# Patient Record
Sex: FEMALE | Race: BLACK OR AFRICAN AMERICAN | NOT HISPANIC OR LATINO | Employment: FULL TIME | URBAN - METROPOLITAN AREA
[De-identification: names, ages, dates, MRNs, and addresses within clinical notes are randomized per-mention and may not be internally consistent; named-entity substitution may affect disease eponyms.]

---

## 2017-01-03 ENCOUNTER — OFFICE VISIT (OUTPATIENT)
Dept: OBSTETRICS AND GYNECOLOGY | Facility: CLINIC | Age: 27
End: 2017-01-03
Payer: MEDICAID

## 2017-01-03 ENCOUNTER — APPOINTMENT (OUTPATIENT)
Dept: LAB | Facility: HOSPITAL | Age: 27
End: 2017-01-03
Attending: OBSTETRICS & GYNECOLOGY
Payer: MEDICAID

## 2017-01-03 VITALS — HEIGHT: 64 IN | WEIGHT: 119.06 LBS | BODY MASS INDEX: 20.32 KG/M2

## 2017-01-03 DIAGNOSIS — Z98.891 HISTORY OF CESAREAN SECTION: ICD-10-CM

## 2017-01-03 DIAGNOSIS — Z30.09 BIRTH CONTROL COUNSELING: ICD-10-CM

## 2017-01-03 PROCEDURE — 0503F POSTPARTUM CARE VISIT: CPT | Mod: ,,, | Performed by: OBSTETRICS & GYNECOLOGY

## 2017-01-03 PROCEDURE — 88175 CYTOPATH C/V AUTO FLUID REDO: CPT

## 2017-01-03 PROCEDURE — 99999 PR PBB SHADOW E&M-EST. PATIENT-LVL II: CPT | Mod: PBBFAC,,, | Performed by: OBSTETRICS & GYNECOLOGY

## 2017-01-03 PROCEDURE — 99212 OFFICE O/P EST SF 10 MIN: CPT | Mod: PBBFAC,PN | Performed by: OBSTETRICS & GYNECOLOGY

## 2017-01-03 NOTE — PROGRESS NOTES
"Subjective:       Arabella Gibbs is a 26 y.o. female who presents for a postpartum visit. She is 5 weeks postpartum following a low cervical transverse  section. I have fully reviewed the prenatal and intrapartum course. The delivery was at 36 gestational weeks. Outcome: repeat  section, low transverse incision. Anesthesia: spinal. Postpartum course has been uncomplicated. Baby's course has been uncomplicated. Baby is feeding by bottle - Enfamil with Iron. Bleeding no bleeding. Bowel function is normal. Bladder function is normal. Patient is not sexually active. Contraception method is abstinence. Postpartum depression screening: negative.    Patient desires Nexplanon. Is not sexually active and wants to wait till device placed.  H/o abnormal pap.     The following portions of the patient's history were reviewed and updated as appropriate: allergies, current medications, past family history, past medical history, past social history, past surgical history and problem list.    Review of Systems  Pertinent items are noted in HPI.     Objective:        Visit Vitals    Ht 5' 4" (1.626 m)    Wt 54 kg (119 lb 0.8 oz)    LMP 2016    Breastfeeding No    BMI 20.43 kg/m2      General:  alert, appears stated age, cooperative and no distress    Breasts:  inspection negative, no nipple discharge or bleeding, no masses or nodularity palpable   Lungs: clear to auscultation bilaterally   Heart:  regular rate and rhythm, S1, S2 normal, no murmur, click, rub or gallop   Abdomen: soft, non-tender; bowel sounds normal; no masses,  no organomegaly. Pfannenstiel  Incision completely healed and non tender to palpation.     Vulva:  normal   Vagina: normal vagina, no discharge, exudate, lesion, or erythema   Cervix:  no cervical motion tenderness and small amount of dk brownish mucous noted at OS. Pap obtained.    Corpus: normal size, contour, position, consistency, mobility, non-tender, regular contour, " firm, mobile and well supported   Adnexa:  no mass, fullness, tenderness   Rectal Exam: Not performed.          Assessment:       Routine postpartum exam. Pap smear done at today's visit.     Plan:      1. Contraception: Desires Nexplanon  2.  Device ordered. Understands must be placed when absolutely certain not pregnant. Will call when in and place when patient on menses or sooner if has remained abstinent and UPT negative. Agrees to remain abstinent till device placed per her desire.   3. Follow up in: 3-4 wks for Nexplanon placement.  weeks or as needed.

## 2017-01-04 ENCOUNTER — TELEPHONE (OUTPATIENT)
Dept: OBSTETRICS AND GYNECOLOGY | Facility: CLINIC | Age: 27
End: 2017-01-04

## 2017-01-11 ENCOUNTER — TELEPHONE (OUTPATIENT)
Dept: OBSTETRICS AND GYNECOLOGY | Facility: CLINIC | Age: 27
End: 2017-01-11

## 2017-01-11 NOTE — TELEPHONE ENCOUNTER
----- Message from Etta Cardona sent at 1/11/2017  1:38 PM CST -----  Contact: otilio jimenez/ al  needs callback to On license of [facility] nexplanon...[phone] option 2

## 2017-01-13 ENCOUNTER — TELEPHONE (OUTPATIENT)
Dept: OBSTETRICS AND GYNECOLOGY | Facility: CLINIC | Age: 27
End: 2017-01-13

## 2017-01-13 NOTE — TELEPHONE ENCOUNTER
Nexplanon device received at the [facility].  LM for patient to call office and schedule placement.

## 2017-01-16 ENCOUNTER — TELEPHONE (OUTPATIENT)
Dept: OBSTETRICS AND GYNECOLOGY | Facility: CLINIC | Age: 27
End: 2017-01-16

## 2017-01-16 NOTE — TELEPHONE ENCOUNTER
----- Message from Bharti Barrett sent at 1/16/2017  9:44 AM CST -----  Contact: pt  Returning missed call - please call again

## 2017-01-16 NOTE — TELEPHONE ENCOUNTER
Pt notified that nexplanon device is in pt recently got off cycle and was advised to call office when cycle comes down again .. KLT

## 2017-02-02 ENCOUNTER — TELEPHONE (OUTPATIENT)
Dept: OBSTETRICS AND GYNECOLOGY | Facility: CLINIC | Age: 27
End: 2017-02-02

## 2017-02-02 NOTE — TELEPHONE ENCOUNTER
----- Message from Swati Banda sent at 2/2/2017  7:56 AM CST -----  Contact: pt  Please call pt @ [phone] regarding scheduling a nurse visit for birth control.

## 2017-02-07 ENCOUNTER — PROCEDURE VISIT (OUTPATIENT)
Dept: OBSTETRICS AND GYNECOLOGY | Facility: CLINIC | Age: 27
End: 2017-02-07
Payer: MEDICAID

## 2017-02-07 VITALS
DIASTOLIC BLOOD PRESSURE: 78 MMHG | BODY MASS INDEX: 21.19 KG/M2 | SYSTOLIC BLOOD PRESSURE: 120 MMHG | HEIGHT: 65 IN | WEIGHT: 127.19 LBS

## 2017-02-07 DIAGNOSIS — Z30.9 ENCOUNTER FOR CONTRACEPTIVE MANAGEMENT, UNSPECIFIED CONTRACEPTIVE ENCOUNTER TYPE: Primary | ICD-10-CM

## 2017-02-07 DIAGNOSIS — Z30.017 ENCOUNTER FOR INITIAL PRESCRIPTION OF IMPLANTABLE SUBDERMAL CONTRACEPTIVE: ICD-10-CM

## 2017-02-07 PROBLEM — Z30.09 BIRTH CONTROL COUNSELING: Status: RESOLVED | Noted: 2017-01-03 | Resolved: 2017-02-07

## 2017-02-07 PROCEDURE — 11981 INSERTION DRUG DLVR IMPLANT: CPT | Mod: PBBFAC,PN | Performed by: OBSTETRICS & GYNECOLOGY

## 2017-02-07 PROCEDURE — 11981 INSERTION DRUG DLVR IMPLANT: CPT | Mod: S$PBB,,, | Performed by: OBSTETRICS & GYNECOLOGY

## 2017-02-07 RX ADMIN — ETONOGESTREL 68 MG: 68 IMPLANT SUBCUTANEOUS at 08:02

## 2017-02-07 NOTE — PROCEDURES
CC: NEXPLANON INSERTION:    UPT was negative.   Patient currently on menses.    PRE-NEXPLANON INSERTION COUNSELING:  All contraceptive options were reviewed and the patient chooses Implanon.  Patients history was reviewed and there were no contraindications to Implanon.  The procedure and minimal risks of pain, bleeding, bruising and infection at the insertion site discussed. Possible irregular menstrual bleeding pattern versus amenorrhea was explained.  No protection against STDs discussed.  Written information provided; all questions answered and patient agrees to proceed.  Consent signed and scanned into computer.    EXAM:  With patient in supine position the nondominant arm was flexed at the elbow and externally rotated. (Left arm)  The insertion site was identified 6-8 cm above the elbow crease at the inner side of the upper arm overlying the groove between biceps and triceps.  The insertion site was marked and a second mali was placed 6-8 cm above the first.    PROCEDURE:  TIME OUT PERFORMED.  The insertion site was prepped with antiseptic and injected with 1 cc of 1% Xylocaine with epinephrine subq along the planned insertion canal.  Xylocaine subq along the planned insertion canal.  Using sterile technique the Nexplanon applicator was visually verified and removed from the blister pack.  The base of the applicator was gently tapped with the needle pointed up until the implant disappeared back into the needle and the needle cap was removed.  The needle tip was inserted bevel side up at a 20 degree angle to penetrate the skin.  The applicator was lowered parallel to the arm and the skin was tented with the needle.  The seal of the applicator was broken by pressing the obturator support and turned 90degrees.  The obturator tip was fixed in place on the arm with one hand and with the other hand the needle was slowly and fully retracted back along full length of the obturator.  The grooved tip of the obturator  was visible inside the needle and the implant waspalpable after insertion.  A small adhesive bandage and then a pressure bandage was placed over the insertion site.  The patient tolerated the procedure well.    ASSESSMENT:  1. Contraception management / Nexplanon insertion.    POST IMPLANON INSERTION COUNSELING:  Manage post Implanon placement arm pain with NSAIDs, Tylenol or Rx per MedCard.  Keep arm elevated and apply intermittent ice packs to decrease pain and bruising for 24 Hours.  May remove bandage in 24 hours.  Nexplanon danger signs (worsening pain at insertion site, bleeding through bandage, redness and/or pus drainage at insertion site).  Removal in 3 years.    Counseling lasted approximately 15 minutes and all her questions were answered.    FOLLOW-UP: with me in two weeks.

## 2017-06-02 ENCOUNTER — OFFICE VISIT (OUTPATIENT)
Dept: OBSTETRICS AND GYNECOLOGY | Facility: CLINIC | Age: 27
End: 2017-06-02
Payer: MEDICAID

## 2017-06-02 VITALS
SYSTOLIC BLOOD PRESSURE: 130 MMHG | WEIGHT: 124 LBS | HEIGHT: 65 IN | DIASTOLIC BLOOD PRESSURE: 80 MMHG | BODY MASS INDEX: 20.66 KG/M2

## 2017-06-02 DIAGNOSIS — Z30.46 NEXPLANON REMOVAL: ICD-10-CM

## 2017-06-02 DIAGNOSIS — N92.1 BREAKTHROUGH BLEEDING ON NEXPLANON: ICD-10-CM

## 2017-06-02 DIAGNOSIS — Z30.9 ENCOUNTER FOR CONTRACEPTIVE MANAGEMENT, UNSPECIFIED TYPE: Primary | ICD-10-CM

## 2017-06-02 DIAGNOSIS — Z97.5 BREAKTHROUGH BLEEDING ON NEXPLANON: ICD-10-CM

## 2017-06-02 PROBLEM — Z30.017 ENCOUNTER FOR INITIAL PRESCRIPTION OF IMPLANTABLE SUBDERMAL CONTRACEPTIVE: Status: RESOLVED | Noted: 2017-02-07 | Resolved: 2017-06-02

## 2017-06-02 PROCEDURE — 11982 REMOVE DRUG IMPLANT DEVICE: CPT | Mod: PBBFAC,PN | Performed by: OBSTETRICS & GYNECOLOGY

## 2017-06-02 PROCEDURE — 11982 REMOVE DRUG IMPLANT DEVICE: CPT | Mod: S$PBB,,, | Performed by: OBSTETRICS & GYNECOLOGY

## 2017-06-02 PROCEDURE — 99999 PR PBB SHADOW E&M-EST. PATIENT-LVL II: CPT | Mod: PBBFAC,,, | Performed by: OBSTETRICS & GYNECOLOGY

## 2017-06-02 PROCEDURE — 99212 OFFICE O/P EST SF 10 MIN: CPT | Mod: PBBFAC,PN | Performed by: OBSTETRICS & GYNECOLOGY

## 2017-06-02 RX ORDER — NORETHINDRONE ACETATE AND ETHINYL ESTRADIOL 1.5-30(21)
1 KIT ORAL DAILY
Qty: 30 TABLET | Refills: 8 | Status: SHIPPED | OUTPATIENT
Start: 2017-06-02 | End: 2020-05-05

## 2017-06-02 NOTE — PROGRESS NOTES
Pt is here for NEXPLANON removal.        COUNSELING:  All contraceptive options were reviewed and the patient chooses COCP after removal of nexplanon.  Patients history was reviewed and there were no contraindications to COCP's  The procedure and minimal risks of pain, bleeding, bruising and infection at the removal site discussed. Possible irregular menstrual bleeding pattern versus amenorrhea was explained.  No protection against STDs discussed.  Written information provided; all questions answered and patient agrees to proceed.  Consent signed and scanned into computer.    EXAM:  With patient in supine position the left arm was flexed at the elbow and externally rotated.  The nexplanon was identified at the inner side of the upper arm overlying the groove between biceps and triceps.      PROCEDURE:  TIME OUT PERFORMED.  The removal site was prepped with Betadyne and injected with 1 mL of Lidocaine with epinephrine subcutaneously.  Using sterile technique a 11 blade knife was used to make a small  incision in the skin. The Nexplanon implant was seen and grasped with a stat and removed without difficulty. Adequate hemostasis was noted.  A small adhesive bandage and then a pressure bandage was placed over the removal site.  The patient tolerated the procedure well.    ASSESSMENT:  1. Contraception management / nexplanon  Removal     POST removal COUNSELING:  Manage arm pain with NSAIDs, Tylenol or Rx per MedCard.  Keep arm elevated and apply intermittent ice packs to decrease pain and bruising for 24 Hours.  May remove bandage in 24 hours.  Danger signs were reviewed with pt (worsening pain at insertion site, bleeding through bandage, redness and/or pus drainage at removal site).      Counseling lasted approximately 15 minutes and all her questions were answered.    FOLLOW-UP:  Annual exam

## 2018-01-29 ENCOUNTER — TELEPHONE (OUTPATIENT)
Dept: OBSTETRICS AND GYNECOLOGY | Facility: CLINIC | Age: 28
End: 2018-01-29

## 2018-01-29 NOTE — TELEPHONE ENCOUNTER
----- Message from Perri Garcia sent at 1/29/2018  1:06 PM CST -----  Contact: Pt   Pt request call back to see about her birth control. Needs to know if she needs to come in for it or not? .[phone] (home)

## 2018-05-10 DIAGNOSIS — Z30.9 ENCOUNTER FOR CONTRACEPTIVE MANAGEMENT, UNSPECIFIED TYPE: ICD-10-CM

## 2018-05-10 RX ORDER — NORETHINDRONE ACETATE AND ETHINYL ESTRADIOL 1.5-30(21)
KIT ORAL
Qty: 28 TABLET | Refills: 8 | OUTPATIENT
Start: 2018-05-10

## 2020-04-15 ENCOUNTER — TELEPHONE (OUTPATIENT)
Dept: OBSTETRICS AND GYNECOLOGY | Facility: CLINIC | Age: 30
End: 2020-04-15

## 2020-04-15 NOTE — TELEPHONE ENCOUNTER
----- Message from Robert Rojas sent at 4/15/2020  4:45 PM CDT -----  Contact: self [phone]  Pt would like to schedule pregnancy confirmation appt.  Jane call back at [phone].  Socrates Lemus

## 2020-05-01 ENCOUNTER — PATIENT MESSAGE (OUTPATIENT)
Dept: OBSTETRICS AND GYNECOLOGY | Facility: CLINIC | Age: 30
End: 2020-05-01

## 2020-05-05 ENCOUNTER — OFFICE VISIT (OUTPATIENT)
Dept: OBSTETRICS AND GYNECOLOGY | Facility: CLINIC | Age: 30
End: 2020-05-05
Payer: MEDICAID

## 2020-05-05 ENCOUNTER — LAB VISIT (OUTPATIENT)
Dept: LAB | Facility: HOSPITAL | Age: 30
End: 2020-05-05
Attending: OBSTETRICS & GYNECOLOGY
Payer: MEDICAID

## 2020-05-05 VITALS
SYSTOLIC BLOOD PRESSURE: 112 MMHG | HEIGHT: 65 IN | WEIGHT: 149.25 LBS | DIASTOLIC BLOOD PRESSURE: 72 MMHG | BODY MASS INDEX: 24.87 KG/M2

## 2020-05-05 DIAGNOSIS — Z32.01 POSITIVE PREGNANCY TEST: ICD-10-CM

## 2020-05-05 DIAGNOSIS — Z98.891 HISTORY OF CESAREAN DELIVERY: ICD-10-CM

## 2020-05-05 DIAGNOSIS — Z87.51 HISTORY OF PRETERM DELIVERY: ICD-10-CM

## 2020-05-05 DIAGNOSIS — O26.841 UTERINE SIZE DATE DISCREPANCY PREGNANCY, FIRST TRIMESTER: ICD-10-CM

## 2020-05-05 DIAGNOSIS — Z32.01 POSITIVE PREGNANCY TEST: Primary | ICD-10-CM

## 2020-05-05 PROBLEM — Z30.46 NEXPLANON REMOVAL: Status: RESOLVED | Noted: 2017-06-02 | Resolved: 2020-05-05

## 2020-05-05 LAB
ABO + RH BLD: NORMAL
BASOPHILS # BLD AUTO: 0.02 K/UL (ref 0–0.2)
BASOPHILS NFR BLD: 0.5 % (ref 0–1.9)
BLD GP AB SCN CELLS X3 SERPL QL: NORMAL
DIFFERENTIAL METHOD: NORMAL
EOSINOPHIL # BLD AUTO: 0 K/UL (ref 0–0.5)
EOSINOPHIL NFR BLD: 1 % (ref 0–8)
ERYTHROCYTE [DISTWIDTH] IN BLOOD BY AUTOMATED COUNT: 12 % (ref 11.5–14.5)
HCT VFR BLD AUTO: 39.7 % (ref 37–48.5)
HGB BLD-MCNC: 12.8 G/DL (ref 12–16)
HGB S BLD QL SOLY: NEGATIVE
IMM GRANULOCYTES # BLD AUTO: 0.01 K/UL (ref 0–0.04)
IMM GRANULOCYTES NFR BLD AUTO: 0.2 % (ref 0–0.5)
LYMPHOCYTES # BLD AUTO: 1.7 K/UL (ref 1–4.8)
LYMPHOCYTES NFR BLD: 42 % (ref 18–48)
MCH RBC QN AUTO: 28.7 PG (ref 27–31)
MCHC RBC AUTO-ENTMCNC: 32.2 G/DL (ref 32–36)
MCV RBC AUTO: 89 FL (ref 82–98)
MONOCYTES # BLD AUTO: 0.3 K/UL (ref 0.3–1)
MONOCYTES NFR BLD: 7.7 % (ref 4–15)
NEUTROPHILS # BLD AUTO: 2 K/UL (ref 1.8–7.7)
NEUTROPHILS NFR BLD: 48.6 % (ref 38–73)
NRBC BLD-RTO: 0 /100 WBC
PLATELET # BLD AUTO: 206 K/UL (ref 150–350)
PMV BLD AUTO: 11.7 FL (ref 9.2–12.9)
RBC # BLD AUTO: 4.46 M/UL (ref 4–5.4)
WBC # BLD AUTO: 4.02 K/UL (ref 3.9–12.7)

## 2020-05-05 PROCEDURE — 87077 CULTURE AEROBIC IDENTIFY: CPT

## 2020-05-05 PROCEDURE — 85025 COMPLETE CBC W/AUTO DIFF WBC: CPT

## 2020-05-05 PROCEDURE — 86703 HIV-1/HIV-2 1 RESULT ANTBDY: CPT

## 2020-05-05 PROCEDURE — 86592 SYPHILIS TEST NON-TREP QUAL: CPT

## 2020-05-05 PROCEDURE — 86762 RUBELLA ANTIBODY: CPT

## 2020-05-05 PROCEDURE — 99999 PR PBB SHADOW E&M-EST. PATIENT-LVL III: CPT | Mod: PBBFAC,,, | Performed by: NURSE PRACTITIONER

## 2020-05-05 PROCEDURE — 85660 RBC SICKLE CELL TEST: CPT

## 2020-05-05 PROCEDURE — 99213 OFFICE O/P EST LOW 20 MIN: CPT | Mod: PBBFAC,TH,PN,25 | Performed by: NURSE PRACTITIONER

## 2020-05-05 PROCEDURE — 87340 HEPATITIS B SURFACE AG IA: CPT

## 2020-05-05 PROCEDURE — 88175 CYTOPATH C/V AUTO FLUID REDO: CPT

## 2020-05-05 PROCEDURE — 87491 CHLMYD TRACH DNA AMP PROBE: CPT

## 2020-05-05 PROCEDURE — 86850 RBC ANTIBODY SCREEN: CPT

## 2020-05-05 PROCEDURE — 99204 OFFICE O/P NEW MOD 45 MIN: CPT | Mod: TH,S$PBB,, | Performed by: NURSE PRACTITIONER

## 2020-05-05 PROCEDURE — 87088 URINE BACTERIA CULTURE: CPT

## 2020-05-05 PROCEDURE — 87086 URINE CULTURE/COLONY COUNT: CPT

## 2020-05-05 PROCEDURE — 99999 PR PBB SHADOW E&M-EST. PATIENT-LVL III: ICD-10-PCS | Mod: PBBFAC,,, | Performed by: NURSE PRACTITIONER

## 2020-05-05 PROCEDURE — 36415 COLL VENOUS BLD VENIPUNCTURE: CPT | Mod: PO

## 2020-05-05 PROCEDURE — 87186 SC STD MICRODIL/AGAR DIL: CPT

## 2020-05-05 PROCEDURE — 99204 PR OFFICE/OUTPT VISIT, NEW, LEVL IV, 45-59 MIN: ICD-10-PCS | Mod: TH,S$PBB,, | Performed by: NURSE PRACTITIONER

## 2020-05-05 NOTE — PROGRESS NOTES
Subjective:       Patient ID: Arabella Gibbs is a 29 y.o. female.    Chief Complaint:  Possible Pregnancy      History of Present Illness  HPI  Arabella Gibbs is a 29 y.o. , presents today for amenorrhea.       C/C: amenorrhea  She has not seen any other provider for this pregnancy     HPI: Reports amenorrhea since Patient's last menstrual period was 2020 (exact date). Prior to LMP, menses were always regular occuring every 28-31 days prior to this lasting 7-9 days.  She is not currently on any contraception. + UPT on 2020.  Pregnancy is planned.  Also reports nausea and vomiting. Has noticed breast tenderness. Had spotting last Monday on one occurrence when she wiped; denies any vaginal bleeding/spotting since then.  Hx x4 c/s; desires BTL with this delivery.  PTL with last two pregnancies.  FOB involved-Adriel Rodriguez Sr.     SOCIAL HISTORY: Denies emotional/mental/physical/sexual violence or abuse. Feels safe at home with significant other and children.     PAP HISTORY: last pap 1/3/2017, results-NILM.  Hx of abnormal pap, had colpo in . Hx of chlamydia . Discussed ASCCP guidelines. Pap today.      Reports no long-term chronic medical conditions.     Review of patient's allergies indicates:  No Known Allergies    GYN & OB History  Patient's last menstrual period was 2020 (exact date).   Date of Last Pap: 2017    OB History    Para Term  AB Living   5 4 2 2 1 4   SAB TAB Ectopic Multiple Live Births   1 0 0 0 4      # Outcome Date GA Lbr Mark/2nd Weight Sex Delivery Anes PTL Lv   5  18 36w0d  2.268 kg (5 lb) M CS-LVertical Spinal Y ANN   4  16 35w4d  2.54 kg (5 lb 9.6 oz) F CS-LTranv Spinal Y ANN   3 SAB 2016           2 Term 13 39w0d  3.374 kg (7 lb 7 oz) M CS-LTranv Spinal  ANN   1 Term 09 39w0d  2.722 kg (6 lb) M CS-LTranv EPI N ANN      Complications: Failure to Progress in First Stage       Review of  Systems  Review of Systems   Constitutional: Positive for fatigue.   Respiratory: Negative for cough, shortness of breath and wheezing.    Cardiovascular: Negative for chest pain.   Gastrointestinal: Positive for nausea and vomiting. Negative for abdominal pain.   Genitourinary: Positive for frequency, menstrual problem and vaginal discharge. Negative for vaginal bleeding.   Integumentary:  Positive for breast tenderness.   Neurological: Negative for headaches.   Psychiatric/Behavioral: Negative for depression. The patient is not nervous/anxious.    All other systems reviewed and are negative.  Breast: Positive for tenderness.    Objective:      Physical Exam:   Constitutional: She is oriented to person, place, and time. She appears well-developed and well-nourished.    HENT:   Head: Normocephalic and atraumatic.    Eyes: Pupils are equal, round, and reactive to light. Conjunctivae and EOM are normal.    Neck: Normal range of motion. Neck supple. No tracheal deviation present. No thyromegaly present.    Cardiovascular: Normal rate and regular rhythm.     Pulmonary/Chest: Effort normal.        Abdominal: Soft. She exhibits no distension. There is no tenderness. Hernia confirmed negative in the right inguinal area and confirmed negative in the left inguinal area.     Genitourinary: Rectum normal and uterus normal. Pelvic exam was performed with patient supine. There is no rash, tenderness, lesion or injury on the right labia. There is no rash, tenderness, lesion or injury on the left labia. Cervix is normal. Right adnexum displays no mass, no tenderness and no fullness. Left adnexum displays no mass, no tenderness and no fullness. Vaginal discharge (large amt of thick, white d/c with an odor) found. Labial bartholins normal.       Uterus Size: 8 cm   Musculoskeletal: Normal range of motion and moves all extremeties.      Lymphadenopathy:        Right: No inguinal adenopathy present.        Left: No inguinal adenopathy  present.    Neurological: She is alert and oriented to person, place, and time.    Skin: Skin is warm and dry.    Psychiatric: She has a normal mood and affect. Her behavior is normal. Judgment and thought content normal.        Assessment:     1. Positive pregnancy test    2. Uterine size date discrepancy pregnancy, first trimester       Plan:   1. Amenorrhea  -- + UPT in office, Patient's last menstrual period was 03/18/2020 (exact date). --> Estimated Date of Delivery: 12/23/2020.  --Likely at 6w6d via LMP  -- Dating US in four weeks  -- Routine serum and urine prenatal labs today    2. Physical exam today.     3. Body mass index is 24.84 kg/m².  -- Discussed IOM recommended weight gain of:   Weight category Pre pre BMI  Recommended TWG   Underweight Less than 18.5 28-40    Normal Weight 18.5-24.9  25-35    Overweight 25-29.9  15-25    Obese   30 and greater  11-20   -- Discussed criteria for delivery at [facility] r/t excessive pre-preg weight or excessive weight gain:   Pre-pregnancy BMI over 40 or excess pregnancy weight gain defined as:   Pre-preg BMI < 18.5; Excess weight gain = > 60 pound   Pre-preg BMI 18.5-24.9;  Excess weight gain = > 53 pounds   Pre-preg BMI 25-29.9;  Excess weight gain = > 38 pounds   Pre-preg BMI > 30;  Excess weight gain = > 30 pounds    4. Discussed nausea and vomiting in pregnancy  -- Education regarding lifestyle and dietary modifications  -- Reviewed use of B6/Unisom prn. Pt will notify us if no relief/worsening symptoms, will consider alternative therapies prn    5. Pregnancy education and couseling; handouts and booklet provided  -- Oriented to practice and anticipated prenatal course of care and how to contact us  -- Precautions/warning signs reviewed  -- Common complaints of pregnancy  -- Routine prenatal labs including HIV  -- Ultrasounds  -- Childbirth education/hospital/[facility] facilities  -- Nutrition, prepregnant BMI, and recommended weight gain  -- Toxoplasmosis precautions  (Cats/Raw Meat)  -- Sexual activity and exercise  -- Environmental/Work hazards  -- Travel  -- Tobacco (Ask, Advise, Assess, Assist, and Arrange), as well as alcohol and drug use  -- Use of any medications (Including supplements, Vitamins, Herbs, or OTC Drugs)  --COVID precautions/policies    6. Reviewed warning signs, precautions, and how/when to contact us.     7. RTC x 4 weeks, call or present sooner prn.     8. Treat BV second trimester; pt prefers metrogel; vaginal hygiene practices discussed.

## 2020-05-05 NOTE — PROGRESS NOTES
Subjective:       Patient ID: Arabella Gibbs is a 29 y.o. female.    Chief Complaint:  Possible Pregnancy      History of Present Illness  HPI  Arabella Gibbs is a 29 y.o. , presents today for amenorrhea.      C/C: amenorrhea  She has not seen any other provider for this pregnancy    HPI: Reports amenorrhea since Patient's last menstrual period was 2020 (exact date). Prior to LMP, menses were always regular occuring every 28-31 days prior to this lasting 7-9 days.  She is not currently on any contraception. + UPT on 2020.  Pregnancy is planned.  Also reports nausea and vomiting. Has noticed breast tenderness. Had spotting last Monday on one occurrence.  Hx x4 c/s; desires BTL with this delivery.  FOB involved-Adriel Rodriguez Sr.    SOCIAL HISTORY: Denies emotional/mental/physical/sexual violence or abuse. Feels safe at home.    PAP HISTORY: last pap 1/3/2017, results-NILM.  Hx of abnormal pap, had colpo in . Hx of chlamydia . Discussed ASCCP guidelines. Pap today.     Reports no long-term chronic medical conditions.    Review of patient's allergies indicates:  No Known Allergies  Past Medical History:   Diagnosis Date    Abnormal Pap smear of cervix     colpo 2013    Anxiety     chest pain anxiety last used meds 2012     Past Surgical History:   Procedure Laterality Date     SECTION      x4     Past Surgical History:   Procedure Laterality Date     SECTION      x4     OB History    Para Term  AB Living   5 4 2 2 1 4   SAB TAB Ectopic Multiple Live Births   1 0 0 0 4      # Outcome Date GA Lbr Mark/2nd Weight Sex Delivery Anes PTL Lv   5  18 36w0d  2.268 kg (5 lb) M CS-LVertical Spinal Y ANN   4  16 35w4d  2.54 kg (5 lb 9.6 oz) F CS-LTranv Spinal Y ANN   3 SAB 2016           2 Term 13 39w0d  3.374 kg (7 lb 7 oz) M CS-LTranv Spinal  ANN   1 Term 09 39w0d  2.722 kg (6 lb) M CS-LTranv EPI N ANN       Complications: Failure to Progress in First Stage     OB History        5    Para   4    Term   2       2    AB   1    Living   4       SAB   1    TAB   0    Ectopic   0    Multiple   0    Live Births   4               Social History     Socioeconomic History    Marital status: Single     Spouse name: Not on file    Number of children: Not on file    Years of education: Not on file    Highest education level: Not on file   Occupational History    Not on file   Social Needs    Financial resource strain: Not on file    Food insecurity:     Worry: Not on file     Inability: Not on file    Transportation needs:     Medical: Not on file     Non-medical: Not on file   Tobacco Use    Smoking status: Never Smoker    Smokeless tobacco: Never Used   Substance and Sexual Activity    Alcohol use: No    Drug use: No    Sexual activity: Yes     Partners: Male   Lifestyle    Physical activity:     Days per week: Not on file     Minutes per session: Not on file    Stress: Not on file   Relationships    Social connections:     Talks on phone: Not on file     Gets together: Not on file     Attends Christian service: Not on file     Active member of club or organization: Not on file     Attends meetings of clubs or organizations: Not on file     Relationship status: Not on file   Other Topics Concern    Not on file   Social History Narrative    Not on file     History reviewed. No pertinent family history.  Social History     Substance and Sexual Activity   Sexual Activity Yes    Partners: Male     Primary Doctor No     Updated Medication List:  Current Outpatient Medications   Medication Sig Dispense Refill    prenatal vit/iron fum/folic ac (PRENATAL 1+1 ORAL) Take by mouth.       No current facility-administered medications for this visit.          GYN & OB History  Patient's last menstrual period was 2020 (exact date).   Date of Last Pap: 2017    OB History    Para Term  AB  Living   5 4 2 2 1 4   SAB TAB Ectopic Multiple Live Births   1 0 0 0 4      # Outcome Date GA Lbr Mark/2nd Weight Sex Delivery Anes PTL Lv   5  18 36w0d  2.268 kg (5 lb) M CS-LVertical Spinal Y ANN   4  16 35w4d  2.54 kg (5 lb 9.6 oz) F CS-LTranv Spinal Y ANN   3 SAB            2 Term 13 39w0d  3.374 kg (7 lb 7 oz) M CS-LTranv Spinal  ANN   1 Term 09 39w0d  2.722 kg (6 lb) M CS-LTranv EPI N ANN      Complications: Failure to Progress in First Stage       Review of Systems  Review of Systems   Constitutional: Positive for fatigue.   Respiratory: Negative for cough, shortness of breath and wheezing.    Gastrointestinal: Positive for nausea and vomiting. Negative for abdominal pain.   Genitourinary: Positive for menstrual problem. Negative for frequency and vaginal bleeding.   Integumentary:  Positive for breast tenderness.   Neurological: Negative for headaches.   Psychiatric/Behavioral: Negative for depression.   All other systems reviewed and are negative.  Breast: Positive for tenderness.          Objective:        Assessment:     1. Positive pregnancy test    2. Uterine size date discrepancy pregnancy, first trimester        Plan:

## 2020-05-06 LAB
FINAL PATHOLOGIC DIAGNOSIS: NORMAL
HBV SURFACE AG SERPL QL IA: NEGATIVE
HIV 1+2 AB+HIV1 P24 AG SERPL QL IA: NEGATIVE
Lab: NORMAL
RPR SER QL: NORMAL
RUBV IGG SER-ACNC: 16.6 IU/ML
RUBV IGG SER-IMP: REACTIVE

## 2020-05-07 LAB
C TRACH DNA SPEC QL NAA+PROBE: NOT DETECTED
N GONORRHOEA DNA SPEC QL NAA+PROBE: NOT DETECTED

## 2020-05-08 ENCOUNTER — PATIENT MESSAGE (OUTPATIENT)
Dept: OBSTETRICS AND GYNECOLOGY | Facility: CLINIC | Age: 30
End: 2020-05-08

## 2020-05-08 DIAGNOSIS — O23.41 UTI (URINARY TRACT INFECTION) IN PREGNANCY IN FIRST TRIMESTER: Primary | ICD-10-CM

## 2020-05-08 LAB — BACTERIA UR CULT: ABNORMAL

## 2020-05-08 RX ORDER — AMOXICILLIN AND CLAVULANATE POTASSIUM 875; 125 MG/1; MG/1
1 TABLET, FILM COATED ORAL EVERY 12 HOURS
Qty: 14 TABLET | Refills: 0 | Status: SHIPPED | OUTPATIENT
Start: 2020-05-08 | End: 2020-05-15

## 2020-05-12 ENCOUNTER — INITIAL PRENATAL (OUTPATIENT)
Dept: OBSTETRICS AND GYNECOLOGY | Facility: CLINIC | Age: 30
End: 2020-05-12
Payer: MEDICAID

## 2020-05-12 ENCOUNTER — PROCEDURE VISIT (OUTPATIENT)
Dept: OBSTETRICS AND GYNECOLOGY | Facility: CLINIC | Age: 30
End: 2020-05-12
Payer: MEDICAID

## 2020-05-12 ENCOUNTER — PATIENT MESSAGE (OUTPATIENT)
Dept: OBSTETRICS AND GYNECOLOGY | Facility: CLINIC | Age: 30
End: 2020-05-12

## 2020-05-12 VITALS
BODY MASS INDEX: 24.98 KG/M2 | SYSTOLIC BLOOD PRESSURE: 116 MMHG | WEIGHT: 150.13 LBS | DIASTOLIC BLOOD PRESSURE: 66 MMHG

## 2020-05-12 DIAGNOSIS — O34.219 PREVIOUS CESAREAN SECTION COMPLICATING PREGNANCY: ICD-10-CM

## 2020-05-12 DIAGNOSIS — O21.9 NAUSEA AND VOMITING IN PREGNANCY: Primary | ICD-10-CM

## 2020-05-12 DIAGNOSIS — Z32.01 POSITIVE PREGNANCY TEST: ICD-10-CM

## 2020-05-12 PROCEDURE — 76801 OB US < 14 WKS SINGLE FETUS: CPT | Mod: PBBFAC,PN | Performed by: OBSTETRICS & GYNECOLOGY

## 2020-05-12 PROCEDURE — 76801 PR US, OB <14WKS, TRANSABD, SINGLE GESTATION: ICD-10-PCS | Mod: 26,S$PBB,, | Performed by: OBSTETRICS & GYNECOLOGY

## 2020-05-12 PROCEDURE — 99212 OFFICE O/P EST SF 10 MIN: CPT | Mod: PBBFAC,TH,PN | Performed by: ADVANCED PRACTICE MIDWIFE

## 2020-05-12 PROCEDURE — 99213 OFFICE O/P EST LOW 20 MIN: CPT | Mod: 25,TH,S$PBB, | Performed by: ADVANCED PRACTICE MIDWIFE

## 2020-05-12 PROCEDURE — 99999 PR PBB SHADOW E&M-EST. PATIENT-LVL II: CPT | Mod: PBBFAC,,, | Performed by: ADVANCED PRACTICE MIDWIFE

## 2020-05-12 PROCEDURE — 99213 PR OFFICE/OUTPT VISIT, EST, LEVL III, 20-29 MIN: ICD-10-PCS | Mod: 25,TH,S$PBB, | Performed by: ADVANCED PRACTICE MIDWIFE

## 2020-05-12 PROCEDURE — 76801 OB US < 14 WKS SINGLE FETUS: CPT | Mod: 26,S$PBB,, | Performed by: OBSTETRICS & GYNECOLOGY

## 2020-05-12 PROCEDURE — 99999 PR PBB SHADOW E&M-EST. PATIENT-LVL II: ICD-10-PCS | Mod: PBBFAC,,, | Performed by: ADVANCED PRACTICE MIDWIFE

## 2020-05-12 RX ORDER — ONDANSETRON 8 MG/1
8 TABLET, ORALLY DISINTEGRATING ORAL EVERY 6 HOURS PRN
Qty: 20 TABLET | Refills: 3 | Status: SHIPPED | OUTPATIENT
Start: 2020-05-12 | End: 2020-06-01 | Stop reason: SDUPTHER

## 2020-05-12 RX ORDER — PROMETHAZINE HYDROCHLORIDE 25 MG/1
25 TABLET ORAL EVERY 6 HOURS PRN
Qty: 30 TABLET | Refills: 1 | Status: ON HOLD | OUTPATIENT
Start: 2020-05-12 | End: 2020-11-26 | Stop reason: HOSPADM

## 2020-05-12 NOTE — PROGRESS NOTES
Patient presents today with worries after one episode of bleeding this morning. States she had a small gush of blood followed by one clot when she wiped. Denies bleeding since or any cramping. Denies vaginal. Discharge.   Patient states that she is worried because she had a previa with her previous pregnancy.   US today shows single intrauterine gestation consistent with LMP dating. Uterus/cervix/adnexa appear WNLs.   Reviewed common discomforts. Encouraged pelvic rest. Will contact office if any more episodes of bleeding occur.   Oriented to our practice, [facility]/MD collaborative care.  Instructed to start prenatal vitamin.   Blue bag info reviewed.  Dietary recommendations made. Still having N/V. Rx sent to pharmacy.   Discussed possible use of edgar in pregnancy with history of  deliveries.

## 2020-06-10 ENCOUNTER — ROUTINE PRENATAL (OUTPATIENT)
Dept: OBSTETRICS AND GYNECOLOGY | Facility: CLINIC | Age: 30
End: 2020-06-10
Payer: MEDICAID

## 2020-06-10 VITALS
SYSTOLIC BLOOD PRESSURE: 116 MMHG | WEIGHT: 159.06 LBS | BODY MASS INDEX: 26.47 KG/M2 | DIASTOLIC BLOOD PRESSURE: 78 MMHG

## 2020-06-10 DIAGNOSIS — O34.219 PREVIOUS CESAREAN SECTION COMPLICATING PREGNANCY: Primary | ICD-10-CM

## 2020-06-10 DIAGNOSIS — Z3A.12 12 WEEKS GESTATION OF PREGNANCY: ICD-10-CM

## 2020-06-10 PROCEDURE — 99213 OFFICE O/P EST LOW 20 MIN: CPT | Mod: TH,S$PBB,, | Performed by: ADVANCED PRACTICE MIDWIFE

## 2020-06-10 PROCEDURE — 99999 PR PBB SHADOW E&M-EST. PATIENT-LVL II: ICD-10-PCS | Mod: PBBFAC,,, | Performed by: ADVANCED PRACTICE MIDWIFE

## 2020-06-10 PROCEDURE — 99212 OFFICE O/P EST SF 10 MIN: CPT | Mod: PBBFAC,TH,PN | Performed by: ADVANCED PRACTICE MIDWIFE

## 2020-06-10 PROCEDURE — 99999 PR PBB SHADOW E&M-EST. PATIENT-LVL II: CPT | Mod: PBBFAC,,, | Performed by: ADVANCED PRACTICE MIDWIFE

## 2020-06-10 PROCEDURE — 99213 PR OFFICE/OUTPT VISIT, EST, LEVL III, 20-29 MIN: ICD-10-PCS | Mod: TH,S$PBB,, | Performed by: ADVANCED PRACTICE MIDWIFE

## 2020-06-11 NOTE — PROGRESS NOTES
Reports no further episodes of bleeding.  States took Rx for UTI and is drinking plenty of water.  9# weight gain since last visit 1 month ago.  Weight gain guidelines reviewed.  Advised to decrease carbs and to start walking.  Risks of increased weight gain reviewed.  C/O constipation.  A-Z booklet tips reviewed.  Advised of safe OC meds and to increase dietary fiber.  Discussed QUAD screen will desire [place].  Reviewed COVID-19 policies. No visitors in clinic and only 1 visitor for labor and delivery. Encouraged following CDC recommendations. Encouraged social distancing and frequent hand washing. Discussed use of connected mom in pregnancy. Enrolled and discussed use of blood pressure cuff/scale at home with possible future telemed visits.

## 2020-07-08 ENCOUNTER — LAB VISIT (OUTPATIENT)
Dept: LAB | Facility: HOSPITAL | Age: 30
End: 2020-07-08
Attending: ADVANCED PRACTICE MIDWIFE
Payer: MEDICAID

## 2020-07-08 ENCOUNTER — ROUTINE PRENATAL (OUTPATIENT)
Dept: OBSTETRICS AND GYNECOLOGY | Facility: CLINIC | Age: 30
End: 2020-07-08
Payer: MEDICAID

## 2020-07-08 VITALS — BODY MASS INDEX: 26.6 KG/M2 | DIASTOLIC BLOOD PRESSURE: 70 MMHG | WEIGHT: 159.81 LBS | SYSTOLIC BLOOD PRESSURE: 116 MMHG

## 2020-07-08 DIAGNOSIS — Z3A.16 16 WEEKS GESTATION OF PREGNANCY: ICD-10-CM

## 2020-07-08 DIAGNOSIS — Z3A.16 16 WEEKS GESTATION OF PREGNANCY: Primary | ICD-10-CM

## 2020-07-08 PROCEDURE — 99212 OFFICE O/P EST SF 10 MIN: CPT | Mod: PBBFAC,PN | Performed by: ADVANCED PRACTICE MIDWIFE

## 2020-07-08 PROCEDURE — 99213 OFFICE O/P EST LOW 20 MIN: CPT | Mod: TH,S$PBB,, | Performed by: ADVANCED PRACTICE MIDWIFE

## 2020-07-08 PROCEDURE — 36415 COLL VENOUS BLD VENIPUNCTURE: CPT | Mod: PO

## 2020-07-08 PROCEDURE — 99999 PR PBB SHADOW E&M-EST. PATIENT-LVL II: ICD-10-PCS | Mod: PBBFAC,,, | Performed by: ADVANCED PRACTICE MIDWIFE

## 2020-07-08 PROCEDURE — 99213 PR OFFICE/OUTPT VISIT, EST, LEVL III, 20-29 MIN: ICD-10-PCS | Mod: TH,S$PBB,, | Performed by: ADVANCED PRACTICE MIDWIFE

## 2020-07-08 PROCEDURE — 99999 PR PBB SHADOW E&M-EST. PATIENT-LVL II: CPT | Mod: PBBFAC,,, | Performed by: ADVANCED PRACTICE MIDWIFE

## 2020-07-08 PROCEDURE — 81511 FTL CGEN ABNOR FOUR ANAL: CPT

## 2020-07-08 NOTE — PROGRESS NOTES
Reports flutters.  Unsure about BF.  States will desire BTL with repeat . Quad screen reviewed, desires, order placed.  Warning S/S reviewed.  Hydration stressed.  States has not heard from Connected Mom[facility] Program.  Orders placed again.  Reviewed COVID-19 policies. No visitors in clinic and only 1 visitor for labor and delivery. Encouraged following CDC recommendations. Encouraged social distancing and frequent hand washing. Discussed use of connected mom in pregnancy. Enrolled and discussed use of blood pressure cuff/scale at home with possible future telemed visits.

## 2020-07-13 LAB
# FETUSES US: NORMAL
2ND TRIMESTER 4 SCREEN PNL SERPL: NEGATIVE
2ND TRIMESTER 4 SCREEN SERPL-IMP: NORMAL
AFP MOM SERPL: 1.12
AFP SERPL-MCNC: 37.1 NG/ML
AGE AT DELIVERY: 30
B-HCG MOM SERPL: 0.78
B-HCG SERPL-ACNC: 32.5 IU/ML
FET TS 21 RISK FROM MAT AGE: NORMAL
GA (DAYS): 0 D
GA (WEEKS): 16 WK
GA METHOD: NORMAL
IDDM PATIENT QL: NORMAL
INHIBIN A MOM SERPL: 1.01
INHIBIN A SERPL-MCNC: 155.2 PG/ML
SMOKING STATUS FTND: NO
TS 18 RISK FETUS: NORMAL
TS 21 RISK FETUS: NORMAL
U ESTRIOL MOM SERPL: 0.95
U ESTRIOL SERPL-MCNC: 0.85 NG/ML

## 2020-08-04 DIAGNOSIS — Z36.3 SCREENING, ANTENATAL, FOR MALFORMATION BY ULTRASOUND: Primary | ICD-10-CM

## 2020-08-06 ENCOUNTER — ROUTINE PRENATAL (OUTPATIENT)
Dept: OBSTETRICS AND GYNECOLOGY | Facility: CLINIC | Age: 30
End: 2020-08-06
Payer: MEDICAID

## 2020-08-06 ENCOUNTER — PROCEDURE VISIT (OUTPATIENT)
Dept: OBSTETRICS AND GYNECOLOGY | Facility: CLINIC | Age: 30
End: 2020-08-06
Payer: MEDICAID

## 2020-08-06 VITALS
DIASTOLIC BLOOD PRESSURE: 78 MMHG | WEIGHT: 165.88 LBS | SYSTOLIC BLOOD PRESSURE: 116 MMHG | BODY MASS INDEX: 27.61 KG/M2

## 2020-08-06 DIAGNOSIS — O34.219 PREVIOUS CESAREAN SECTION COMPLICATING PREGNANCY: ICD-10-CM

## 2020-08-06 DIAGNOSIS — Z36.3 SCREENING, ANTENATAL, FOR MALFORMATION BY ULTRASOUND: ICD-10-CM

## 2020-08-06 DIAGNOSIS — O09.892 HISTORY OF PRETERM DELIVERY, CURRENTLY PREGNANT IN SECOND TRIMESTER: Primary | ICD-10-CM

## 2020-08-06 DIAGNOSIS — Z34.82 ENCOUNTER FOR SUPERVISION OF OTHER NORMAL PREGNANCY IN SECOND TRIMESTER: ICD-10-CM

## 2020-08-06 PROBLEM — Z3A.12 12 WEEKS GESTATION OF PREGNANCY: Status: RESOLVED | Noted: 2020-06-10 | Resolved: 2020-08-06

## 2020-08-06 PROCEDURE — 99999 PR PBB SHADOW E&M-EST. PATIENT-LVL II: ICD-10-PCS | Mod: PBBFAC,,, | Performed by: OBSTETRICS & GYNECOLOGY

## 2020-08-06 PROCEDURE — 99213 PR OFFICE/OUTPT VISIT, EST, LEVL III, 20-29 MIN: ICD-10-PCS | Mod: TH,S$PBB,, | Performed by: OBSTETRICS & GYNECOLOGY

## 2020-08-06 PROCEDURE — 99212 OFFICE O/P EST SF 10 MIN: CPT | Mod: PBBFAC,TH,PN,25 | Performed by: OBSTETRICS & GYNECOLOGY

## 2020-08-06 PROCEDURE — 99999 PR PBB SHADOW E&M-EST. PATIENT-LVL II: CPT | Mod: PBBFAC,,, | Performed by: OBSTETRICS & GYNECOLOGY

## 2020-08-06 PROCEDURE — 76805 OB US >/= 14 WKS SNGL FETUS: CPT | Mod: PBBFAC,PN | Performed by: OBSTETRICS & GYNECOLOGY

## 2020-08-06 PROCEDURE — 99213 OFFICE O/P EST LOW 20 MIN: CPT | Mod: TH,S$PBB,, | Performed by: OBSTETRICS & GYNECOLOGY

## 2020-08-06 PROCEDURE — 76805 OB US >/= 14 WKS SNGL FETUS: CPT | Mod: 26,S$PBB,, | Performed by: OBSTETRICS & GYNECOLOGY

## 2020-08-06 PROCEDURE — 76805 PR US, OB 14+WKS, TRANSABD, SINGLE GESTATION: ICD-10-PCS | Mod: 26,S$PBB,, | Performed by: OBSTETRICS & GYNECOLOGY

## 2020-08-06 RX ORDER — HYDROXYPROGESTERONE CAPROATE 250 MG/ML
250 INJECTION INTRAMUSCULAR
Status: DISCONTINUED | OUTPATIENT
Start: 2020-08-06 | End: 2020-08-13

## 2020-08-06 RX ADMIN — HYDROXYPROGESTERONE CAPROATE 250 MG: 250 INJECTION INTRAMUSCULAR at 09:08

## 2020-08-06 NOTE — PROGRESS NOTES
+fetal movement, no srom, no vaginal bleeding  sono report reviwed with patient--br, 347gm, 26%, post placenta, no previa, carlos wnl; no abnl; cervix 4 cm closed    Previous deliveries 2016 and 2018, 35-36 w, due to  labor (records requested from dr schneider)  edgar started ; continue weekly until 35 w    Reviewed  tubal ligation procedure in detail--.  Reviewed risks including but not limited to infection, bleeding, damage to bowel/bladder, cva;htn. Pt aware procedure is permanent and cannot be reversed.  Pt aware risk of failure 3-1000; slightly increased risk for pregnancy in tube; pt aware needs pregnancy test if skips menses.  may see a change in menses--heavier, irregular,  All questions answered to the best of my ability.  Alternatives reviewed --condoms, ocp, mirena, depo, nuva ring, nexplanon, abstinence.  Consents signed and witnessed  Will discuss with pt complete removal of tube vs segment of tube removed    Medicaid  cosent signed  C/section with  hospital consent signed    Glucola/cbc/rpr/hiv at next visit

## 2020-08-06 NOTE — LETTER
August 6, 2020      Jasper MIGUEL  [address] [address]  Phone: [phone]       Patient: Arabella Gibbs   YOB: 1990  Date of Visit: 08/06/2020    To Whom It May Concern:    Thi Gibbs  was at [facility] on 08/06/2020. She may return to work 8/6/20 with no   Restrictions. She will need to come weekly for visits . If you have any questions or concerns, or if I can be of further assistance, please do not hesitate to contact me.    Sincerely,    Alondra Short LPN

## 2020-08-10 ENCOUNTER — TELEPHONE (OUTPATIENT)
Dept: OBSTETRICS AND GYNECOLOGY | Facility: CLINIC | Age: 30
End: 2020-08-10

## 2020-08-10 NOTE — TELEPHONE ENCOUNTER
----- Message from Bonnie Borja sent at 8/10/2020 10:31 AM CDT -----  Contact: ISACC FRAZIER [[id number]] @ [phone]  Medicaid patient want to Reschedule her appointment.

## 2020-08-12 ENCOUNTER — TELEPHONE (OUTPATIENT)
Dept: OBSTETRICS AND GYNECOLOGY | Facility: CLINIC | Age: 30
End: 2020-08-12

## 2020-08-13 ENCOUNTER — CLINICAL SUPPORT (OUTPATIENT)
Dept: OBSTETRICS AND GYNECOLOGY | Facility: CLINIC | Age: 30
End: 2020-08-13
Payer: MEDICAID

## 2020-08-13 NOTE — PROGRESS NOTES
Verified pt by two identifiers: name and date of birth. Allergies and medications reviewed.      Cayla Given IM RT arm  using aseptic technique. No discomfort noted, pt tolerated well. Advised to wait 15 minutes in clinic to monitor. Patient verbalized understanding.

## 2020-08-20 ENCOUNTER — CLINICAL SUPPORT (OUTPATIENT)
Dept: OBSTETRICS AND GYNECOLOGY | Facility: CLINIC | Age: 30
End: 2020-08-20
Payer: MEDICAID

## 2020-08-20 DIAGNOSIS — O09.892 HISTORY OF PRETERM DELIVERY, CURRENTLY PREGNANT IN SECOND TRIMESTER: Primary | ICD-10-CM

## 2020-08-20 PROCEDURE — 99999 PR PBB SHADOW E&M-EST. PATIENT-LVL I: CPT | Mod: PBBFAC,,,

## 2020-08-20 PROCEDURE — 99211 OFF/OP EST MAY X REQ PHY/QHP: CPT | Mod: PBBFAC,TH,PN

## 2020-08-20 PROCEDURE — 99999 PR PBB SHADOW E&M-EST. PATIENT-LVL I: ICD-10-PCS | Mod: PBBFAC,,,

## 2020-08-20 RX ORDER — HYDROXYPROGESTERONE CAPROATE 250 MG/ML
250 INJECTION INTRAMUSCULAR
Status: DISCONTINUED | OUTPATIENT
Start: 2020-08-20 | End: 2020-08-20

## 2020-08-20 NOTE — PROGRESS NOTES
Verified patient with 2 patient identifiers. Allergies and medications reviewed.   Wray 250mg/ml given SQ to left arm using aseptic technique.   No discomfort noted. Patient tolerated well.     Next injection scheduled.    Patient advised to wait 15 minutes in lobby to monitor for reaction.   Patient verbalized understanding.

## 2020-08-31 ENCOUNTER — PATIENT MESSAGE (OUTPATIENT)
Dept: OBSTETRICS AND GYNECOLOGY | Facility: CLINIC | Age: 30
End: 2020-08-31

## 2020-09-10 ENCOUNTER — ROUTINE PRENATAL (OUTPATIENT)
Dept: OBSTETRICS AND GYNECOLOGY | Facility: CLINIC | Age: 30
End: 2020-09-10
Payer: MEDICAID

## 2020-09-10 ENCOUNTER — LAB VISIT (OUTPATIENT)
Dept: LAB | Facility: HOSPITAL | Age: 30
End: 2020-09-10
Attending: OBSTETRICS & GYNECOLOGY
Payer: MEDICAID

## 2020-09-10 VITALS
SYSTOLIC BLOOD PRESSURE: 114 MMHG | DIASTOLIC BLOOD PRESSURE: 70 MMHG | BODY MASS INDEX: 28.62 KG/M2 | WEIGHT: 171.94 LBS

## 2020-09-10 DIAGNOSIS — Z34.82 ENCOUNTER FOR SUPERVISION OF OTHER NORMAL PREGNANCY IN SECOND TRIMESTER: ICD-10-CM

## 2020-09-10 DIAGNOSIS — O34.219 PREVIOUS CESAREAN SECTION COMPLICATING PREGNANCY: ICD-10-CM

## 2020-09-10 DIAGNOSIS — O09.892 HISTORY OF PRETERM DELIVERY, CURRENTLY PREGNANT IN SECOND TRIMESTER: ICD-10-CM

## 2020-09-10 DIAGNOSIS — O09.892 HISTORY OF PRETERM DELIVERY, CURRENTLY PREGNANT IN SECOND TRIMESTER: Primary | ICD-10-CM

## 2020-09-10 LAB
BASOPHILS # BLD AUTO: 0.03 K/UL (ref 0–0.2)
BASOPHILS NFR BLD: 0.4 % (ref 0–1.9)
DIFFERENTIAL METHOD: ABNORMAL
EOSINOPHIL # BLD AUTO: 0.1 K/UL (ref 0–0.5)
EOSINOPHIL NFR BLD: 1.3 % (ref 0–8)
ERYTHROCYTE [DISTWIDTH] IN BLOOD BY AUTOMATED COUNT: 12.7 % (ref 11.5–14.5)
HCT VFR BLD AUTO: 31.7 % (ref 37–48.5)
HGB BLD-MCNC: 10.1 G/DL (ref 12–16)
IMM GRANULOCYTES # BLD AUTO: 0.05 K/UL (ref 0–0.04)
IMM GRANULOCYTES NFR BLD AUTO: 0.7 % (ref 0–0.5)
LYMPHOCYTES # BLD AUTO: 1.6 K/UL (ref 1–4.8)
LYMPHOCYTES NFR BLD: 23.6 % (ref 18–48)
MCH RBC QN AUTO: 28.9 PG (ref 27–31)
MCHC RBC AUTO-ENTMCNC: 31.9 G/DL (ref 32–36)
MCV RBC AUTO: 91 FL (ref 82–98)
MONOCYTES # BLD AUTO: 0.4 K/UL (ref 0.3–1)
MONOCYTES NFR BLD: 5.6 % (ref 4–15)
NEUTROPHILS # BLD AUTO: 4.7 K/UL (ref 1.8–7.7)
NEUTROPHILS NFR BLD: 68.4 % (ref 38–73)
NRBC BLD-RTO: 0 /100 WBC
PLATELET # BLD AUTO: 153 K/UL (ref 150–350)
PMV BLD AUTO: 12.4 FL (ref 9.2–12.9)
RBC # BLD AUTO: 3.49 M/UL (ref 4–5.4)
WBC # BLD AUTO: 6.83 K/UL (ref 3.9–12.7)

## 2020-09-10 PROCEDURE — 82950 GLUCOSE TEST: CPT

## 2020-09-10 PROCEDURE — 85025 COMPLETE CBC W/AUTO DIFF WBC: CPT

## 2020-09-10 PROCEDURE — 99213 OFFICE O/P EST LOW 20 MIN: CPT | Mod: TH,S$PBB,, | Performed by: OBSTETRICS & GYNECOLOGY

## 2020-09-10 PROCEDURE — 99999 PR PBB SHADOW E&M-EST. PATIENT-LVL II: CPT | Mod: PBBFAC,,, | Performed by: OBSTETRICS & GYNECOLOGY

## 2020-09-10 PROCEDURE — 86703 HIV-1/HIV-2 1 RESULT ANTBDY: CPT

## 2020-09-10 PROCEDURE — 36415 COLL VENOUS BLD VENIPUNCTURE: CPT | Mod: PO

## 2020-09-10 PROCEDURE — 99212 OFFICE O/P EST SF 10 MIN: CPT | Mod: PBBFAC,TH,PN | Performed by: OBSTETRICS & GYNECOLOGY

## 2020-09-10 PROCEDURE — 99213 PR OFFICE/OUTPT VISIT, EST, LEVL III, 20-29 MIN: ICD-10-PCS | Mod: TH,S$PBB,, | Performed by: OBSTETRICS & GYNECOLOGY

## 2020-09-10 PROCEDURE — 86592 SYPHILIS TEST NON-TREP QUAL: CPT

## 2020-09-10 PROCEDURE — 99999 PR PBB SHADOW E&M-EST. PATIENT-LVL II: ICD-10-PCS | Mod: PBBFAC,,, | Performed by: OBSTETRICS & GYNECOLOGY

## 2020-09-10 RX ORDER — HYDROXYPROGESTERONE CAPROATE 250 MG/ML
250 INJECTION INTRAMUSCULAR
Status: ACTIVE | OUTPATIENT
Start: 2020-09-10 | End: 2020-11-19

## 2020-09-10 RX ADMIN — HYDROXYPROGESTERONE CAPROATE 250 MG: 250 INJECTION INTRAMUSCULAR at 09:09

## 2020-09-10 NOTE — PROGRESS NOTES
Verified pt by two identifiers: name and date of birth.Allergies and medications reviewed.     Cayla 250 mg/ml Given IM to left ventrogluteal using aseptic technique. No discomfort noted, pt tolerated well. Advised to wait 15 minutes in clinic to monitor. Next appointment is scheduled.  Patient verbalized understanding.

## 2020-09-10 NOTE — LETTER
September 10, 2020      Jasper MIGUEL  [address] [facility] [address] [address]  Phone: [phone]       Patient: Arabella Gibbs   YOB: 1990  Date of Visit: 09/10/2020    To Whom It May Concern:    Thi Gibbs  was at [facility] on 09/10/2020. She may return to work on 09/10/20 with no restrictions. If you have any questions or concerns, or if I can be of further assistance, please do not hesitate to contact me.    Sincerely,    Kaylynn Alfonso LPN

## 2020-09-10 NOTE — PROGRESS NOTES
Pt c/o leg pain on right-denies swelling  Denies hip/back pain  Right leg, limited mobility with flexion, hamstrings on both legs extremely tight  Advised heat and stretching    Pt wishes to decrease work hours--works 10hr days with [employer] ; feels she can only work 5 hrs /d    Reports missing edgar injections due to lack of scheduling appts; walked into office and was told that ob nurse was not avail  Stressed impt of weekly edgar  Denies vaginal bleeding/contractions    Glucola/cbc/rpr/hiv testing in process  Flu shot offered, pt has to think about it  Weight gain talk next visit  tdap handout/risk of formula feeding handout given  Coffective counseling sheet Protect Breastfeeding discussed with mother. Reinforced avoidence of artifical nipples and formula, unless medically indicated.  Encouraged mother to download Co[employer]ive mobile aneta if she has not already done so. Mother verbalizes understanding.

## 2020-09-11 LAB
GLUCOSE SERPL-MCNC: 129 MG/DL (ref 70–140)
HIV 1+2 AB+HIV1 P24 AG SERPL QL IA: NEGATIVE
RPR SER QL: NORMAL

## 2020-09-17 ENCOUNTER — CLINICAL SUPPORT (OUTPATIENT)
Dept: OBSTETRICS AND GYNECOLOGY | Facility: CLINIC | Age: 30
End: 2020-09-17
Payer: MEDICAID

## 2020-09-17 PROCEDURE — 96372 THER/PROPH/DIAG INJ SC/IM: CPT | Mod: PBBFAC,PN

## 2020-09-17 RX ADMIN — HYDROXYPROGESTERONE CAPROATE 250 MG: 250 INJECTION INTRAMUSCULAR at 12:09

## 2020-09-17 NOTE — PROGRESS NOTES
Verified pt by two identifiers: name and date of birth. Allergies and medications reviewed.      Cayla Given IM left ventrogluteal using aseptic technique. No discomfort noted, pt tolerated well. Advised to wait 15 minutes in clinic to monitor. Patient verbalized understanding.

## 2020-09-24 ENCOUNTER — CLINICAL SUPPORT (OUTPATIENT)
Dept: OBSTETRICS AND GYNECOLOGY | Facility: CLINIC | Age: 30
End: 2020-09-24
Payer: MEDICAID

## 2020-09-24 PROCEDURE — 96372 THER/PROPH/DIAG INJ SC/IM: CPT | Mod: PBBFAC,PN

## 2020-09-24 RX ADMIN — HYDROXYPROGESTERONE CAPROATE 250 MG: 250 INJECTION INTRAMUSCULAR at 02:09

## 2020-09-24 NOTE — PROGRESS NOTES
Verified pt by two identifiers: name and date of birth. Allergies and medications reviewed.      Cayla Given IM right ventrogluteal using aseptic technique. No discomfort noted, pt tolerated well. Advised to wait 15 minutes in clinic to monitor. Patient verbalized understanding.

## 2020-10-01 ENCOUNTER — CLINICAL SUPPORT (OUTPATIENT)
Dept: OBSTETRICS AND GYNECOLOGY | Facility: CLINIC | Age: 30
End: 2020-10-01
Payer: MEDICAID

## 2020-10-01 PROCEDURE — 96372 THER/PROPH/DIAG INJ SC/IM: CPT | Mod: PBBFAC,PN

## 2020-10-01 RX ADMIN — HYDROXYPROGESTERONE CAPROATE 250 MG: 250 INJECTION INTRAMUSCULAR at 02:10

## 2020-10-08 ENCOUNTER — CLINICAL SUPPORT (OUTPATIENT)
Dept: OBSTETRICS AND GYNECOLOGY | Facility: CLINIC | Age: 30
End: 2020-10-08
Payer: MEDICAID

## 2020-10-08 ENCOUNTER — HOSPITAL ENCOUNTER (OUTPATIENT)
Facility: HOSPITAL | Age: 30
LOS: 1 days | Discharge: HOME | End: 2020-10-08
Attending: OBSTETRICS & GYNECOLOGY | Admitting: OBSTETRICS & GYNECOLOGY
Payer: MEDICAID

## 2020-10-08 ENCOUNTER — PATIENT MESSAGE (OUTPATIENT)
Dept: OBSTETRICS AND GYNECOLOGY | Facility: HOSPITAL | Age: 30
End: 2020-10-08

## 2020-10-08 VITALS
DIASTOLIC BLOOD PRESSURE: 73 MMHG | SYSTOLIC BLOOD PRESSURE: 129 MMHG | TEMPERATURE: 98 F | HEART RATE: 101 BPM | OXYGEN SATURATION: 97 %

## 2020-10-08 DIAGNOSIS — O47.9 IRREGULAR UTERINE CONTRACTIONS: ICD-10-CM

## 2020-10-08 PROBLEM — Z32.01 POSITIVE PREGNANCY TEST: Status: RESOLVED | Noted: 2020-05-05 | Resolved: 2020-10-08

## 2020-10-08 LAB
BILIRUB UR QL STRIP: NEGATIVE
CLARITY UR: CLEAR
COLOR UR: YELLOW
GLUCOSE UR QL STRIP: NEGATIVE
HGB UR QL STRIP: ABNORMAL
KETONES UR QL STRIP: NEGATIVE
LEUKOCYTE ESTERASE UR QL STRIP: NEGATIVE
NITRITE UR QL STRIP: NEGATIVE
PH UR STRIP: 7 [PH] (ref 5–8)
PROT UR QL STRIP: NEGATIVE
SP GR UR STRIP: 1.02 (ref 1–1.03)
URN SPEC COLLECT METH UR: ABNORMAL
UROBILINOGEN UR STRIP-ACNC: NEGATIVE EU/DL

## 2020-10-08 PROCEDURE — 99211 OFF/OP EST MAY X REQ PHY/QHP: CPT | Mod: TH

## 2020-10-08 PROCEDURE — 96372 THER/PROPH/DIAG INJ SC/IM: CPT | Mod: PBBFAC,PN

## 2020-10-08 PROCEDURE — 81003 URINALYSIS AUTO W/O SCOPE: CPT

## 2020-10-08 PROCEDURE — 59025 OBTAIN FETAL NONSTRESS TEST (NST): ICD-10-PCS | Mod: 26,S$PBB,, | Performed by: MIDWIFE

## 2020-10-08 PROCEDURE — 59025 FETAL NON-STRESS TEST: CPT

## 2020-10-08 PROCEDURE — 99213 OFFICE O/P EST LOW 20 MIN: CPT | Mod: TH,25,S$PBB, | Performed by: MIDWIFE

## 2020-10-08 PROCEDURE — 59025 FETAL NON-STRESS TEST: CPT | Mod: 26,S$PBB,, | Performed by: MIDWIFE

## 2020-10-08 PROCEDURE — 99213 PR OFFICE/OUTPT VISIT, EST, LEVL III, 20-29 MIN: ICD-10-PCS | Mod: TH,25,S$PBB, | Performed by: MIDWIFE

## 2020-10-08 RX ORDER — ONDANSETRON 8 MG/1
8 TABLET, ORALLY DISINTEGRATING ORAL EVERY 8 HOURS PRN
Status: DISCONTINUED | OUTPATIENT
Start: 2020-10-08 | End: 2020-10-09 | Stop reason: HOSPADM

## 2020-10-08 RX ORDER — ACETAMINOPHEN 500 MG
500 TABLET ORAL EVERY 6 HOURS PRN
Status: DISCONTINUED | OUTPATIENT
Start: 2020-10-08 | End: 2020-10-09 | Stop reason: HOSPADM

## 2020-10-08 RX ADMIN — HYDROXYPROGESTERONE CAPROATE 250 MG: 250 INJECTION INTRAMUSCULAR at 02:10

## 2020-10-09 NOTE — SUBJECTIVE & OBJECTIVE
Obstetric HPI:  Patient reports contractions, active fetal movement, No vaginal bleeding , No loss of fluid     This pregnancy has been complicated by hx of  birth, prev c/s X4    OB History    Para Term  AB Living   6 4 2 2 1 4   SAB TAB Ectopic Multiple Live Births   1 0 0 0 4      # Outcome Date GA Lbr Mark/2nd Weight Sex Delivery Anes PTL Lv   6 Current            5  18 36w0d  2.268 kg (5 lb) M CS-LVertical Spinal Y ANN      Name: Li   4  16 35w4d  2.54 kg (5 lb 9.6 oz) F CS-LTranv Spinal Y ANN      Name: KARIN, GIRL ISACC      Apgar1: 8  Apgar5: 9   3 SAB            2 Term 13 39w0d  3.374 kg (7 lb 7 oz) M CS-LTranv Spinal  ANN      Name: Omar   1 Term 09 39w0d  2.722 kg (6 lb) M CS-LTranv EPI N ANN      Complications: Failure to Progress in First Stage      Name: Marita     Past Medical History:   Diagnosis Date    Abnormal Pap smear of cervix     colpo 2013    Anxiety     chest pain anxiety last used meds 2012     Past Surgical History:   Procedure Laterality Date     SECTION      x4       Facility-Administered Medications Prior to Admission   Medication    HYDROXYprogest(PF)(preg presv) AtIn 250 mg    HYDROXYprogesterone caproate ([place]) injection 250 mg     PTA Medications   Medication Sig    ondansetron (ZOFRAN-ODT) 8 MG TbDL Take 1 tablet (8 mg total) by mouth every 6 (six) hours as needed.    prenatal vit/iron fum/folic ac (PRENATAL 1+1 ORAL) Take by mouth.    promethazine (PHENERGAN) 25 MG tablet Take 1 tablet (25 mg total) by mouth every 6 (six) hours as needed for Nausea.       Review of patient's allergies indicates:  No Known Allergies     Family History     None        Tobacco Use    Smoking status: Never Smoker    Smokeless tobacco: Never Used   Substance and Sexual Activity    Alcohol use: No    Drug use: No    Sexual activity: Yes     Partners: Male     Review of Systems   Eyes: Negative for visual  disturbance.   Gastrointestinal: Positive for abdominal pain.   Genitourinary: Negative for vaginal bleeding.   Musculoskeletal: Positive for back pain.   Neurological: Negative for headaches.      Objective:     Vital Signs (Most Recent):    Vital Signs (24h Range):           There is no height or weight on file to calculate BMI.    FHT: Cat 1 (reassuring)  TOCO:  no ctx's    Physical Exam:   Constitutional: She is oriented to person, place, and time. She appears well-developed and well-nourished.    HENT:   Head: Normocephalic.     Neck: Normal range of motion.     Pulmonary/Chest: Effort normal.        Abdominal: Soft.   gravid     Genitourinary:    Vagina normal.             Musculoskeletal: Normal range of motion and moves all extremeties. No edema.       Neurological: She is alert and oriented to person, place, and time.    Skin: Skin is warm and dry.    Psychiatric: She has a normal mood and affect. Her behavior is normal. Judgment and thought content normal.       Cervix:  Dilation:  deferred  Effacement:    Station:   Presentation:      Significant Labs:  Lab Results   Component Value Date    GROUPTRH O POS 05/05/2020    HEPBSAG Negative 05/05/2020    STREPBCULT No Group B Streptococcus isolated 11/25/2016       Recent Labs   Lab 10/08/20  2040   COLORU Yellow   SPECGRAV 1.025   PHUR 7.0   PROTEINUA Negative   NITRITE Negative   LEUKOCYTESUR Negative   UROBILINOGEN Negative     I have personallly reviewed all pertinent lab results from the last 24 hours.  Recent Lab Results       10/08/20  2040        Appearance, UA Clear     Bilirubin (UA) Negative     Color, UA Yellow     Glucose, UA Negative     Ketones, UA Negative     Leukocytes, UA Negative     NITRITE UA Negative     Occult Blood UA Trace     pH, UA 7.0     Protein, UA Negative  Comment:  Recommend a 24 hour urine protein or a urine   protein/creatinine ratio if globulin induced proteinuria is  clinically suspected.       Specific [place], UA 1.025      Specimen UA Urine, Clean Catch     UROBILINOGEN UA Negative

## 2020-10-09 NOTE — DISCHARGE SUMMARY
[facility]  Obstetrics  Discharge Summary      Patient Name: Arabella Gibbs  MRN: [id number]  Admission Date: 10/8/2020  Hospital Length of Stay: 1 days  Discharge Date and Time:  10/08/2020 9:35 PM  Attending Physician: Krupa Quintana MD   Discharging Provider: Maxwell Tillman CNM   Primary Care Provider: Primary Doctor No    HPI: 31 yo, , 29w 1d, presents to unit w/ c/o uterine contractions every 5 minutes    FHT: Cat 1 (reassuring)  TOCO:  no ctx's    Procedure(s) (LRB):   SECTION (N/A)     Hospital Course:   Observe  UA-normal, trace blood  NST-reactive         Final Active Diagnoses:    Diagnosis Date Noted POA    PRINCIPAL PROBLEM:  Irregular uterine contractions [O62.2] 10/08/2020 Yes      Problems Resolved During this Admission:        Significant Diagnostic Studies: Labs: All labs within the past 24 hours have been reviewed          Immunizations     None          This patient has no babies on file.  Pending Diagnostic Studies:     None          Discharged Condition: stable    Disposition: Home or Self Care    Follow Up:  Follow-up Information     Please follow up.    Why: as scheduled               Patient Instructions:   No discharge procedures on file.  Medications:  Current Discharge Medication List      CONTINUE these medications which have NOT CHANGED    Details   ondansetron (ZOFRAN-ODT) 8 MG TbDL Take 1 tablet (8 mg total) by mouth every 6 (six) hours as needed.  Qty: 20 tablet, Refills: 3    Associated Diagnoses: Nausea and vomiting in pregnancy      prenatal vit/iron fum/folic ac (PRENATAL 1+1 ORAL) Take by mouth.      promethazine (PHENERGAN) 25 MG tablet Take 1 tablet (25 mg total) by mouth every 6 (six) hours as needed for Nausea.  Qty: 30 tablet, Refills: 1    Associated Diagnoses: Nausea and vomiting in pregnancy             Maxwell Tillman CNM  Obstetrics  [facility]

## 2020-10-09 NOTE — NURSING
Gave patient AVS and educated on  discharge information. Educated on nutrition, hydration, home medications, fetal kick counts, activity, s/s of OB emergencies, and reasons to notify OB provider (including vaginal bleeding like a period, rupture of membranes, constant and strong abdominal pain or tenderness that does not go away, contractions every 3-5 min for 1-2 hours that are increasing in strength, changes in urination such as hematuria/oliguria/dysuria/urgency/frequency, temp greater than 100.4 F). Pre-eclampsia precautions. Patient verbalized understanding. Provided patient with phone number for any further questions or concerns.

## 2020-10-09 NOTE — PROCEDURES
Arabella Gibbs is a 30 y.o. female patient.            Obtain Fetal nonstress test (NST)    Date/Time: 10/8/2020 9:37 PM  Performed by: Maxwell Tillman CNM  Authorized by: Maxwell Tillman CNM     Nonstress Test:     Variability:  6-25 BPM    Decelerations:  None    Accelerations:  15 bpm    Uterine Irritability: No      Contractions:  Not present  Biophysical Profile:     Nonstress Test Interpretation: reactive      Overall Impression:  Reassuring  Post-procedure:     Patient tolerance:  Patient tolerated the procedure well with no immediate complications        Maxwell Tillman  10/8/2020

## 2020-10-09 NOTE — H&P
[facility] -   Obstetrics  History & Physical    Patient Name: Arabella Gibbs  MRN: [id number]  Admission Date: 10/8/2020  Primary Care Provider: Primary Doctor No    Subjective:     Principal Problem:Irregular uterine contractions    History of Present Illness:  29 yo, , 29w 1d, presents to unit w/ c/o uterine contractions every 5 minutes    Obstetric HPI:  Patient reports contractions, active fetal movement, No vaginal bleeding , No loss of fluid     This pregnancy has been complicated by hx of  birth, prev c/s X4    OB History    Para Term  AB Living   6 4 2 2 1 4   SAB TAB Ectopic Multiple Live Births   1 0 0 0 4      # Outcome Date GA Lbr Mark/2nd Weight Sex Delivery Anes PTL Lv   6 Current            5  18 36w0d  2.268 kg (5 lb) M CS-LVertical Spinal Y ANN      Name: Li   4  16 35w4d  2.54 kg (5 lb 9.6 oz) F CS-LTranv Spinal Y ANN      Name: KARIN, JOSH DEXTER      Apgar1: 8  Apgar5: 9   3 SAB 2016           2 Term 13 39w0d  3.374 kg (7 lb 7 oz) M CS-LTranv Spinal  ANN      Name: Omar   1 Term 09 39w0d  2.722 kg (6 lb) M CS-LTranv EPI N ANN      Complications: Failure to Progress in First Stage      Name: Marita     Past Medical History:   Diagnosis Date    Abnormal Pap smear of cervix     colpo 2013    Anxiety     chest pain anxiety last used meds 2012     Past Surgical History:   Procedure Laterality Date     SECTION      x4       Facility-Administered Medications Prior to Admission   Medication    HYDROXYprogest(PF)(preg presv) AtIn 250 mg    HYDROXYprogesterone caproate ([place]) injection 250 mg     PTA Medications   Medication Sig    ondansetron (ZOFRAN-ODT) 8 MG TbDL Take 1 tablet (8 mg total) by mouth every 6 (six) hours as needed.    prenatal vit/iron fum/folic ac (PRENATAL 1+1 ORAL) Take by mouth.    promethazine (PHENERGAN) 25 MG tablet Take 1 tablet (25 mg total) by mouth every 6 (six) hours  as needed for Nausea.       Review of patient's allergies indicates:  No Known Allergies     Family History     None        Tobacco Use    Smoking status: Never Smoker    Smokeless tobacco: Never Used   Substance and Sexual Activity    Alcohol use: No    Drug use: No    Sexual activity: Yes     Partners: Male     Review of Systems   Eyes: Negative for visual disturbance.   Gastrointestinal: Positive for abdominal pain.   Genitourinary: Negative for vaginal bleeding.   Musculoskeletal: Positive for back pain.   Neurological: Negative for headaches.      Objective:     Vital Signs (Most Recent):    Vital Signs (24h Range):           There is no height or weight on file to calculate BMI.    FHT: Cat 1 (reassuring)  TOCO:  no ctx's    Physical Exam:   Constitutional: She is oriented to person, place, and time. She appears well-developed and well-nourished.    HENT:   Head: Normocephalic.     Neck: Normal range of motion.     Pulmonary/Chest: Effort normal.        Abdominal: Soft.   gravid     Genitourinary:    Vagina normal.             Musculoskeletal: Normal range of motion and moves all extremeties. No edema.       Neurological: She is alert and oriented to person, place, and time.    Skin: Skin is warm and dry.    Psychiatric: She has a normal mood and affect. Her behavior is normal. Judgment and thought content normal.       Cervix:  Dilation:  deferred  Effacement:    Station:   Presentation:      Significant Labs:  Lab Results   Component Value Date    GROUPTRH O POS 05/05/2020    HEPBSAG Negative 05/05/2020    STREPBCULT No Group B Streptococcus isolated 11/25/2016       Recent Labs   Lab 10/08/20  2040   COLORU Yellow   SPECGRAV 1.025   PHUR 7.0   PROTEINUA Negative   NITRITE Negative   LEUKOCYTESUR Negative   UROBILINOGEN Negative     I have personallly reviewed all pertinent lab results from the last 24 hours.  Recent Lab Results       10/08/20  2040        Appearance, UA Clear     Bilirubin (UA)  Negative     Color, UA Yellow     Glucose, UA Negative     Ketones, UA Negative     Leukocytes, UA Negative     NITRITE UA Negative     Occult Blood UA Trace     pH, UA 7.0     Protein, UA Negative  Comment:  Recommend a 24 hour urine protein or a urine   protein/creatinine ratio if globulin induced proteinuria is  clinically suspected.       Specific [place], UA 1.025     Specimen UA Urine, Clean Catch     UROBILINOGEN UA Negative         Assessment/Plan:     30 y.o. female  at 29w1d for:    * Irregular uterine contractions  Observe  NST   UA        Maxwell Tillman CNM  Obstetrics  [facility]

## 2020-10-09 NOTE — DISCHARGE INSTRUCTIONS
Discharge Instructions    Self Care Instructions:    Diet:  · Eat from the five basic food groups  · Fruits and proteins are good choices  · Limit fast foods and added salt/sugar  · Moderate carbonated and caffeine drinks    Hydration:  · Drink at least 8 large glasses of water a day    Kick Counts:  · After a meal, rest on your side and note the baby's movements until you have 8-10 movements in a 2 hour counting period.    · If you do not feel your baby move 8-10 times within 2 hours or you sense a change in the type or character of the baby's movement, you should come in to the hospital at once.  · Remember; your baby can sleep for 20-40 minutes at a time.      When to notify your provider:    · Vaginal bleeding like a period;  You may spot if we examined your cervix.  · If your water breaks, come to the birth center.  Note time, color and odor.  · Abdominal tenderness or pain that does not go away  · Contractions every 3 to 5 minutes for 1 to 2 hours.  True contractions move from front to back, are regular; usually get longer, stronger and closer together and do not stop if you change your position or activity.  · Any burning, urgency or frequency in relation to emptying your bladder.  · Any temperature greater than 100.4 degrees, chills, flu-like symptoms      [phone]

## 2020-10-15 ENCOUNTER — ROUTINE PRENATAL (OUTPATIENT)
Dept: OBSTETRICS AND GYNECOLOGY | Facility: CLINIC | Age: 30
End: 2020-10-15
Payer: MEDICAID

## 2020-10-15 VITALS — WEIGHT: 183.31 LBS | SYSTOLIC BLOOD PRESSURE: 116 MMHG | BODY MASS INDEX: 30.5 KG/M2 | DIASTOLIC BLOOD PRESSURE: 72 MMHG

## 2020-10-15 DIAGNOSIS — Z87.51 HISTORY OF PRETERM DELIVERY: ICD-10-CM

## 2020-10-15 DIAGNOSIS — Z3A.30 30 WEEKS GESTATION OF PREGNANCY: ICD-10-CM

## 2020-10-15 DIAGNOSIS — O34.219 PREVIOUS CESAREAN SECTION COMPLICATING PREGNANCY: Primary | ICD-10-CM

## 2020-10-15 PROCEDURE — 90715 TDAP VACCINE 7 YRS/> IM: CPT | Mod: PBBFAC,PN

## 2020-10-15 PROCEDURE — 99213 PR OFFICE/OUTPT VISIT, EST, LEVL III, 20-29 MIN: ICD-10-PCS | Mod: TH,S$PBB,, | Performed by: ADVANCED PRACTICE MIDWIFE

## 2020-10-15 PROCEDURE — 99213 OFFICE O/P EST LOW 20 MIN: CPT | Mod: TH,S$PBB,, | Performed by: ADVANCED PRACTICE MIDWIFE

## 2020-10-15 PROCEDURE — 90472 IMMUNIZATION ADMIN EACH ADD: CPT | Mod: PBBFAC,PN

## 2020-10-15 PROCEDURE — 90471 IMMUNIZATION ADMIN: CPT | Mod: PBBFAC,PN

## 2020-10-15 PROCEDURE — 99999 PR PBB SHADOW E&M-EST. PATIENT-LVL II: CPT | Mod: PBBFAC,,, | Performed by: ADVANCED PRACTICE MIDWIFE

## 2020-10-15 PROCEDURE — 99212 OFFICE O/P EST SF 10 MIN: CPT | Mod: PBBFAC,TH,PN | Performed by: ADVANCED PRACTICE MIDWIFE

## 2020-10-15 PROCEDURE — 99999 PR PBB SHADOW E&M-EST. PATIENT-LVL II: ICD-10-PCS | Mod: PBBFAC,,, | Performed by: ADVANCED PRACTICE MIDWIFE

## 2020-10-15 RX ORDER — FERROUS SULFATE 325(65) MG
325 TABLET, DELAYED RELEASE (ENTERIC COATED) ORAL 2 TIMES DAILY
Qty: 60 TABLET | Refills: 3 | Status: SHIPPED | OUTPATIENT
Start: 2020-10-15 | End: 2021-01-27 | Stop reason: SDUPTHER

## 2020-10-15 RX ADMIN — HYDROXYPROGESTERONE CAPROATE 250 MG: 250 INJECTION INTRAMUSCULAR at 02:10

## 2020-10-15 NOTE — PROGRESS NOTES
Reports good FM.  Seen in [facility] 10/8 for CTX, notes reviewed.  Denies any VB, LOF, or regular CTX.  TDAP and Flu today.  Rina is taking iron.  Continues with weekly Cayla.  Is having increasing difficulty working her job as it does get physical and she is outside most of the day.  Note given to begin maternity leave.  She understands she is not eligible for FMLA at her jab yet but they are going to hold her position for her.  12# weight gain in past moth.  Reviewed healthy weight goal expectations and advised to decrease carbs  And increase water intake.  Normal FKC's and S/SPTL reviewed.

## 2020-10-15 NOTE — LETTER
October 15, 2020      Jasper MIGUEL  [address] [address]  Phone: [phone]       Patient: Arabella Gibbs   YOB: 1990  Date of Visit: 10/15/2020    To Whom It May Concern:    Thi Gibbs  was at [facility] on 10/15/2020. She is experiencing some pregnancy complications at present and need to begin her maternity leave.    Sincerely,        Abida Carpenter CNM

## 2020-10-22 ENCOUNTER — CLINICAL SUPPORT (OUTPATIENT)
Dept: OBSTETRICS AND GYNECOLOGY | Facility: CLINIC | Age: 30
End: 2020-10-22
Payer: MEDICAID

## 2020-10-22 PROCEDURE — 96372 THER/PROPH/DIAG INJ SC/IM: CPT | Mod: PBBFAC,PN

## 2020-10-22 RX ADMIN — HYDROXYPROGESTERONE CAPROATE 250 MG: 250 INJECTION INTRAMUSCULAR at 09:10

## 2020-10-24 ENCOUNTER — HOSPITAL ENCOUNTER (OUTPATIENT)
Facility: HOSPITAL | Age: 30
Discharge: HOME | End: 2020-10-24
Attending: OBSTETRICS & GYNECOLOGY | Admitting: OBSTETRICS & GYNECOLOGY
Payer: MEDICAID

## 2020-10-24 VITALS — DIASTOLIC BLOOD PRESSURE: 75 MMHG | HEART RATE: 96 BPM | SYSTOLIC BLOOD PRESSURE: 118 MMHG | TEMPERATURE: 99 F

## 2020-10-24 DIAGNOSIS — O47.9 UTERINE CONTRACTIONS DURING PREGNANCY: ICD-10-CM

## 2020-10-24 LAB
BILIRUB UR QL STRIP: NEGATIVE
CLARITY UR: CLEAR
COLOR UR: YELLOW
FIBRONECTIN FETAL SPEC QL: NEGATIVE
GLUCOSE UR QL STRIP: NEGATIVE
HGB UR QL STRIP: ABNORMAL
KETONES UR QL STRIP: NEGATIVE
LEUKOCYTE ESTERASE UR QL STRIP: NEGATIVE
NITRITE UR QL STRIP: NEGATIVE
PH UR STRIP: 7 [PH] (ref 5–8)
PROT UR QL STRIP: NEGATIVE
SP GR UR STRIP: 1.01 (ref 1–1.03)
URN SPEC COLLECT METH UR: ABNORMAL
UROBILINOGEN UR STRIP-ACNC: NEGATIVE EU/DL

## 2020-10-24 PROCEDURE — 81003 URINALYSIS AUTO W/O SCOPE: CPT

## 2020-10-24 PROCEDURE — 99213 OFFICE O/P EST LOW 20 MIN: CPT | Mod: TH,25,S$PBB, | Performed by: MIDWIFE

## 2020-10-24 PROCEDURE — 82731 ASSAY OF FETAL FIBRONECTIN: CPT

## 2020-10-24 PROCEDURE — 99213 PR OFFICE/OUTPT VISIT, EST, LEVL III, 20-29 MIN: ICD-10-PCS | Mod: TH,25,S$PBB, | Performed by: MIDWIFE

## 2020-10-24 PROCEDURE — 99211 OFF/OP EST MAY X REQ PHY/QHP: CPT | Mod: TH,25

## 2020-10-24 PROCEDURE — 59025 OBTAIN FETAL NONSTRESS TEST (NST): ICD-10-PCS | Mod: 26,S$PBB,, | Performed by: MIDWIFE

## 2020-10-24 PROCEDURE — G0378 HOSPITAL OBSERVATION PER HR: HCPCS

## 2020-10-24 PROCEDURE — 59025 FETAL NON-STRESS TEST: CPT

## 2020-10-24 PROCEDURE — 59025 FETAL NON-STRESS TEST: CPT | Mod: 26,S$PBB,, | Performed by: MIDWIFE

## 2020-10-24 RX ORDER — ONDANSETRON 8 MG/1
8 TABLET, ORALLY DISINTEGRATING ORAL EVERY 8 HOURS PRN
Status: DISCONTINUED | OUTPATIENT
Start: 2020-10-24 | End: 2020-10-24 | Stop reason: HOSPADM

## 2020-10-24 RX ORDER — ACETAMINOPHEN 500 MG
500 TABLET ORAL EVERY 6 HOURS PRN
Status: DISCONTINUED | OUTPATIENT
Start: 2020-10-24 | End: 2020-10-24 | Stop reason: HOSPADM

## 2020-10-24 RX ORDER — LABETALOL 300 MG/1
300 TABLET, FILM COATED ORAL EVERY 12 HOURS
Qty: 60 TABLET | Refills: 1 | Status: SHIPPED | OUTPATIENT
Start: 2020-10-24 | End: 2020-10-24 | Stop reason: HOSPADM

## 2020-10-24 RX ORDER — METRONIDAZOLE 500 MG/1
500 TABLET ORAL EVERY 12 HOURS
Qty: 14 TABLET | Refills: 0 | Status: SHIPPED | OUTPATIENT
Start: 2020-10-24 | End: 2020-12-28

## 2020-10-24 NOTE — ASSESSMENT & PLAN NOTE
R/o PTL   Continuous monitoring  Speculum exam performed. FFN, wet prep, and dry slide collected. No pooling noted with exam. Ferning not seen on slide. + clue cells noted. Will treat for BV  SVE 0/50/-4  Await FFN results

## 2020-10-24 NOTE — DISCHARGE INSTRUCTIONS

## 2020-10-24 NOTE — PROCEDURES
Arabella Gibbs is a 30 y.o. female patient.    Temp: 99.4 °F (37.4 °C) (10/24/20 1148)  Pulse: 96 (10/24/20 1320)  BP: 118/75 (10/24/20 1320)       Obtain Fetal nonstress test (NST)    Date/Time: 10/24/2020 1:26 PM  Performed by: Jessica Nye CNM  Authorized by: Jessica Nye CNM     Nonstress Test:     Variability:  6-25 BPM    Decelerations:  None    Accelerations:  15 bpm    Baseline:  140    Uterine Irritability: No      Contractions:  Not present  Biophysical Profile:     Nonstress Test Interpretation: reactive      Overall Impression:  Reassuring  Post-procedure:     Patient tolerance:  Patient tolerated the procedure well with no immediate complications        Jessica Nye  10/24/2020

## 2020-10-24 NOTE — H&P
[facility] -   Obstetrics  History & Physical    Patient Name: Arabella Gibbs  MRN: [id number]  Admission Date: 10/24/2020  Primary Care Provider: Primary Doctor No    Subjective:     Principal Problem:<principal problem not specified>    History of Present Illness:  30 y.o.  BLADE 2020 EGA 31w3d here for contractions and vaginal discharge since 0650am.     Obstetric HPI:  Patient reports contractions since 0650am, active fetal movement, No vaginal bleeding , questionable loss of fluid     This pregnancy has been complicated by:  Previous c/s x4, history of  labor x2    OB History    Para Term  AB Living   6 4 2 2 1 4   SAB TAB Ectopic Multiple Live Births   1 0 0 0 4      # Outcome Date GA Lbr Mark/2nd Weight Sex Delivery Anes PTL Lv   6 Current            5  18 36w0d  2.268 kg (5 lb) M CS-LVertical Spinal Y ANN      Name: Li   4  16 35w4d  2.54 kg (5 lb 9.6 oz) F CS-LTranv Spinal Y ANN      Name: KARIN, JOSH DEXTER      Apgar1: 8  Apgar5: 9   3 SAB 2016           2 Term 13 39w0d  3.374 kg (7 lb 7 oz) M CS-LTranv Spinal  ANN      Name: Omar   1 Term 09 39w0d  2.722 kg (6 lb) M CS-LTranv EPI N ANN      Complications: Failure to Progress in First Stage      Name: Marita     Past Medical History:   Diagnosis Date    Abnormal Pap smear of cervix     colpo 2013    Anxiety     chest pain anxiety last used meds 2012     Past Surgical History:   Procedure Laterality Date     SECTION      x4       Facility-Administered Medications Prior to Admission   Medication    HYDROXYprogest(PF)(preg presv) AtIn 250 mg    HYDROXYprogesterone caproate ([place]) injection 250 mg     PTA Medications   Medication Sig    ferrous sulfate 325 (65 FE) MG EC tablet Take 1 tablet (325 mg total) by mouth 2 (two) times daily.    ondansetron (ZOFRAN-ODT) 8 MG TbDL Take 1 tablet (8 mg total) by mouth every 6 (six) hours as needed.     prenatal vit/iron fum/folic ac (PRENATAL 1+1 ORAL) Take by mouth.    promethazine (PHENERGAN) 25 MG tablet Take 1 tablet (25 mg total) by mouth every 6 (six) hours as needed for Nausea.       Review of patient's allergies indicates:  No Known Allergies     Family History     None        Tobacco Use    Smoking status: Never Smoker    Smokeless tobacco: Never Used   Substance and Sexual Activity    Alcohol use: No    Drug use: No    Sexual activity: Yes     Partners: Male     Review of Systems   Gastrointestinal: Positive for abdominal pain.   Musculoskeletal: Positive for back pain.      Objective:     Vital Signs (Most Recent):    Vital Signs (24h Range):           There is no height or weight on file to calculate BMI.    FHT: 140 Cat 1 (reassuring)  TOCO: irreguler     Physical Exam:   Constitutional: She is oriented to person, place, and time. She appears well-developed and well-nourished.    HENT:   Head: Normocephalic.    Eyes: Conjunctivae are normal.    Neck: Normal range of motion.     Pulmonary/Chest: Effort normal.        Abdominal: Soft.     Genitourinary:    Vagina and uterus normal.             Musculoskeletal: Normal range of motion.       Neurological: She is alert and oriented to person, place, and time.    Skin: Skin is warm and dry.    Psychiatric: She has a normal mood and affect. Her behavior is normal. Judgment and thought content normal.       Cervix:  Dilation:  0  Effacement:  50%  Station: -3  Presentation:unable to determine      Significant Labs:  Lab Results   Component Value Date    GROUPTRH O POS 2020    HEPBSAG Negative 2020    STREPBCULT No Group B Streptococcus isolated 2016       I have personallly reviewed all pertinent lab results from the last 24 hours.    Assessment/Plan:     30 y.o. female  at 31w3d for:    Uterine contractions during pregnancy  R/o PTL   Continuous monitoring  Speculum exam performed. FFN, wet prep, and dry slide collected. No  pooling noted with exam. Ferning not seen on slide. + clue cells noted. Will treat for BV  SVE 0/50/-4  Await FFN results           Jessica Nye CNM  Obstetrics  [facility]

## 2020-10-24 NOTE — HOSPITAL COURSE
R/o PTL   Continuous monitoring  Speculum exam performed. FFN, wet prep, and dry slide collected. No pooling noted with exam. Ferning not seen on slide. + clue cells noted. Will treat for BV  SVE 0/50/-4  Await FFN and UA results   1320pm- FFN negative, UA neg. Acontractile on the monitor. Pt states she is no longer corina. Discharge instructions discussed and when to return to the hospital.

## 2020-10-24 NOTE — DISCHARGE SUMMARY
[facility] -   Obstetrics  Discharge Summary      Patient Name: Arabella Gibbs  MRN: [id number]  Admission Date: 10/24/2020  Hospital Length of Stay: 0 days  Discharge Date and Time:  10/24/2020 1:25 PM  Attending Physician: Denisse Maravilla MD   Discharging Provider: Jessica Nye CNM   Primary Care Provider: Primary Doctor No    HPI: 30 y.o.  BLADE 2020 EGA 31w3d here for contractions and vaginal discharge since 0650am.     FHT: 140 Cat 1 (reassuring)  TOCO: none  * No surgery found *     Hospital Course:   R/o PTL   Continuous monitoring  Speculum exam performed. FFN, wet prep, and dry slide collected. No pooling noted with exam. Ferning not seen on slide. + clue cells noted. Will treat for BV  /-4  Await FFN and UA results   1320pm- FFN negative, UA neg. Acontractile on the monitor. Pt states she is no longer corina. Discharge instructions discussed and when to return to the hospital.          Final Active Diagnoses:      Problems Resolved During this Admission:    Diagnosis Date Noted Date Resolved POA    PRINCIPAL PROBLEM:  Uterine contractions during pregnancy [O62.2] 10/24/2020 10/24/2020 Yes        Significant Diagnostic Studies: Labs: All labs within the past 24 hours have been reviewed      Immunizations     Date Immunization Status Dose Route/Site Given by    10/15/20 1413 Influenza - Quadrivalent - PF *Preferred* (6 months and older) Given 0.5 mL Intramuscular/Left deltoid Alondraoscar Short LPN    10/15/20 1414 Tdap Given 0.5 mL Intramuscular/Right deltoid Alondra Short LPN          This patient has no babies on file.  Pending Diagnostic Studies:     None          Discharged Condition: good    Disposition: Home or Self Care    Follow Up:    Patient Instructions:   No discharge procedures on file.  Medications:  Current Discharge Medication List      START taking these medications    Details   metroNIDAZOLE (FLAGYL) 500 MG tablet Take 1 tablet (500 mg  total) by mouth every 12 (twelve) hours.  Qty: 14 tablet, Refills: 0         CONTINUE these medications which have NOT CHANGED    Details   ferrous sulfate 325 (65 FE) MG EC tablet Take 1 tablet (325 mg total) by mouth 2 (two) times daily.  Qty: 60 tablet, Refills: 3      ondansetron (ZOFRAN-ODT) 8 MG TbDL Take 1 tablet (8 mg total) by mouth every 6 (six) hours as needed.  Qty: 20 tablet, Refills: 3    Associated Diagnoses: Nausea and vomiting in pregnancy      prenatal vit/iron fum/folic ac (PRENATAL 1+1 ORAL) Take by mouth.      promethazine (PHENERGAN) 25 MG tablet Take 1 tablet (25 mg total) by mouth every 6 (six) hours as needed for Nausea.  Qty: 30 tablet, Refills: 1    Associated Diagnoses: Nausea and vomiting in pregnancy             Jessica Nye CNM  Obstetrics  [facility] -

## 2020-10-24 NOTE — SUBJECTIVE & OBJECTIVE
Obstetric HPI:  Patient reports contractions since 0650am, active fetal movement, No vaginal bleeding , questionable loss of fluid     This pregnancy has been complicated by:  Previous c/s x4, history of  labor x2    OB History    Para Term  AB Living   6 4 2 2 1 4   SAB TAB Ectopic Multiple Live Births   1 0 0 0 4      # Outcome Date GA Lbr Mark/2nd Weight Sex Delivery Anes PTL Lv   6 Current            5  18 36w0d  2.268 kg (5 lb) M CS-LVertical Spinal Y ANN      Name: Li   4  16 35w4d  2.54 kg (5 lb 9.6 oz) F CS-LTranv Spinal Y ANN      Name: KARIN, GIRL ISACC      Apgar1: 8  Apgar5: 9   3 SAB            2 Term 13 39w0d  3.374 kg (7 lb 7 oz) M CS-LTranv Spinal  ANN      Name: Omar   1 Term 09 39w0d  2.722 kg (6 lb) M CS-LTranv EPI N ANN      Complications: Failure to Progress in First Stage      Name: Ankurayaan     Past Medical History:   Diagnosis Date    Abnormal Pap smear of cervix     colpo 2013    Anxiety     chest pain anxiety last used meds 2012     Past Surgical History:   Procedure Laterality Date     SECTION      x4       Facility-Administered Medications Prior to Admission   Medication    HYDROXYprogest(PF)(preg presv) AtIn 250 mg    HYDROXYprogesterone caproate ([place]) injection 250 mg     PTA Medications   Medication Sig    ferrous sulfate 325 (65 FE) MG EC tablet Take 1 tablet (325 mg total) by mouth 2 (two) times daily.    ondansetron (ZOFRAN-ODT) 8 MG TbDL Take 1 tablet (8 mg total) by mouth every 6 (six) hours as needed.    prenatal vit/iron fum/folic ac (PRENATAL 1+1 ORAL) Take by mouth.    promethazine (PHENERGAN) 25 MG tablet Take 1 tablet (25 mg total) by mouth every 6 (six) hours as needed for Nausea.       Review of patient's allergies indicates:  No Known Allergies     Family History     None        Tobacco Use    Smoking status: Never Smoker    Smokeless tobacco: Never Used   Substance and Sexual  Activity    Alcohol use: No    Drug use: No    Sexual activity: Yes     Partners: Male     Review of Systems   Gastrointestinal: Positive for abdominal pain.   Musculoskeletal: Positive for back pain.      Objective:     Vital Signs (Most Recent):    Vital Signs (24h Range):           There is no height or weight on file to calculate BMI.    FHT: 140 Cat 1 (reassuring)  TOCO: irreguler     Physical Exam:   Constitutional: She is oriented to person, place, and time. She appears well-developed and well-nourished.    HENT:   Head: Normocephalic.    Eyes: Conjunctivae are normal.    Neck: Normal range of motion.     Pulmonary/Chest: Effort normal.        Abdominal: Soft.     Genitourinary:    Vagina and uterus normal.             Musculoskeletal: Normal range of motion.       Neurological: She is alert and oriented to person, place, and time.    Skin: Skin is warm and dry.    Psychiatric: She has a normal mood and affect. Her behavior is normal. Judgment and thought content normal.       Cervix:  Dilation:  0  Effacement:  50%  Station: -3  Presentation:unable to determine      Significant Labs:  Lab Results   Component Value Date    GROUPTRH O POS 05/05/2020    HEPBSAG Negative 05/05/2020    STREPBCULT No Group B Streptococcus isolated 11/25/2016       I have personallly reviewed all pertinent lab results from the last 24 hours.

## 2020-10-28 ENCOUNTER — ROUTINE PRENATAL (OUTPATIENT)
Dept: OBSTETRICS AND GYNECOLOGY | Facility: CLINIC | Age: 30
End: 2020-10-28
Payer: MEDICAID

## 2020-10-28 VITALS
WEIGHT: 185.63 LBS | DIASTOLIC BLOOD PRESSURE: 78 MMHG | SYSTOLIC BLOOD PRESSURE: 120 MMHG | BODY MASS INDEX: 30.89 KG/M2

## 2020-10-28 DIAGNOSIS — Z3A.30 30 WEEKS GESTATION OF PREGNANCY: ICD-10-CM

## 2020-10-28 DIAGNOSIS — Z36.89 ENCOUNTER FOR ULTRASOUND TO CHECK FETAL GROWTH: Primary | ICD-10-CM

## 2020-10-28 PROCEDURE — 99213 PR OFFICE/OUTPT VISIT, EST, LEVL III, 20-29 MIN: ICD-10-PCS | Mod: TH,S$PBB,, | Performed by: ADVANCED PRACTICE MIDWIFE

## 2020-10-28 PROCEDURE — 99999 PR PBB SHADOW E&M-EST. PATIENT-LVL II: CPT | Mod: PBBFAC,,, | Performed by: ADVANCED PRACTICE MIDWIFE

## 2020-10-28 PROCEDURE — 99213 OFFICE O/P EST LOW 20 MIN: CPT | Mod: TH,S$PBB,, | Performed by: ADVANCED PRACTICE MIDWIFE

## 2020-10-28 PROCEDURE — 99212 OFFICE O/P EST SF 10 MIN: CPT | Mod: PBBFAC,TH,PN | Performed by: ADVANCED PRACTICE MIDWIFE

## 2020-10-28 PROCEDURE — 99999 PR PBB SHADOW E&M-EST. PATIENT-LVL II: ICD-10-PCS | Mod: PBBFAC,,, | Performed by: ADVANCED PRACTICE MIDWIFE

## 2020-10-28 RX ORDER — HYDROXYPROGESTERONE CAPROATE 250 MG/ML
250 INJECTION INTRAMUSCULAR
Status: COMPLETED | OUTPATIENT
Start: 2020-10-28 | End: 2020-10-28

## 2020-10-28 RX ADMIN — HYDROXYPROGESTERONE CAPROATE 250 MG: 250 INJECTION INTRAMUSCULAR at 09:10

## 2020-10-28 NOTE — PROGRESS NOTES
Reports good FM.  Seen in LND 10/24 for CTX.  Notes reviewed.  Cayla today.  T3 labs reviewed.  S/S PTL and normal FKC's reviewed.  Advised to watch carb intake.  Hydration stressed.  Continues with home and pelvic rest.

## 2020-11-04 ENCOUNTER — CLINICAL SUPPORT (OUTPATIENT)
Dept: OBSTETRICS AND GYNECOLOGY | Facility: CLINIC | Age: 30
End: 2020-11-04
Payer: MEDICAID

## 2020-11-04 PROCEDURE — 96372 THER/PROPH/DIAG INJ SC/IM: CPT | Mod: PBBFAC,PN

## 2020-11-04 RX ORDER — HYDROXYPROGESTERONE CAPROATE 250 MG/ML
250 INJECTION INTRAMUSCULAR
Status: COMPLETED | OUTPATIENT
Start: 2020-11-04 | End: 2020-11-04

## 2020-11-04 RX ADMIN — HYDROXYPROGESTERONE CAPROATE 250 MG: 250 INJECTION INTRAMUSCULAR at 09:11

## 2020-11-11 ENCOUNTER — ROUTINE PRENATAL (OUTPATIENT)
Dept: OBSTETRICS AND GYNECOLOGY | Facility: CLINIC | Age: 30
End: 2020-11-11
Payer: MEDICAID

## 2020-11-11 ENCOUNTER — PROCEDURE VISIT (OUTPATIENT)
Dept: OBSTETRICS AND GYNECOLOGY | Facility: CLINIC | Age: 30
End: 2020-11-11
Payer: MEDICAID

## 2020-11-11 VITALS
BODY MASS INDEX: 31.33 KG/M2 | WEIGHT: 188.31 LBS | DIASTOLIC BLOOD PRESSURE: 80 MMHG | SYSTOLIC BLOOD PRESSURE: 122 MMHG

## 2020-11-11 DIAGNOSIS — Z3A.34 34 WEEKS GESTATION OF PREGNANCY: Primary | ICD-10-CM

## 2020-11-11 DIAGNOSIS — Z87.51 HISTORY OF PRETERM DELIVERY: ICD-10-CM

## 2020-11-11 DIAGNOSIS — Z36.89 ENCOUNTER FOR ULTRASOUND TO CHECK FETAL GROWTH: ICD-10-CM

## 2020-11-11 PROCEDURE — 99999 PR PBB SHADOW E&M-EST. PATIENT-LVL II: CPT | Mod: PBBFAC,,, | Performed by: ADVANCED PRACTICE MIDWIFE

## 2020-11-11 PROCEDURE — 59025 FETAL NON-STRESS TEST: CPT | Mod: 59,PBBFAC,PN | Performed by: OBSTETRICS & GYNECOLOGY

## 2020-11-11 PROCEDURE — 99213 OFFICE O/P EST LOW 20 MIN: CPT | Mod: TH,S$PBB,25, | Performed by: ADVANCED PRACTICE MIDWIFE

## 2020-11-11 PROCEDURE — 99999 PR PBB SHADOW E&M-EST. PATIENT-LVL II: ICD-10-PCS | Mod: PBBFAC,,, | Performed by: ADVANCED PRACTICE MIDWIFE

## 2020-11-11 PROCEDURE — 76816 OB US FOLLOW-UP PER FETUS: CPT | Mod: 59,PBBFAC,PN | Performed by: OBSTETRICS & GYNECOLOGY

## 2020-11-11 PROCEDURE — 76818 FETAL BIOPHYS PROFILE W/NST: CPT | Mod: 26,S$PBB,, | Performed by: OBSTETRICS & GYNECOLOGY

## 2020-11-11 PROCEDURE — 99213 PR OFFICE/OUTPT VISIT, EST, LEVL III, 20-29 MIN: ICD-10-PCS | Mod: TH,S$PBB,25, | Performed by: ADVANCED PRACTICE MIDWIFE

## 2020-11-11 PROCEDURE — 99212 OFFICE O/P EST SF 10 MIN: CPT | Mod: PBBFAC,PN,25 | Performed by: ADVANCED PRACTICE MIDWIFE

## 2020-11-11 PROCEDURE — 76818 PR US, OB, FETAL BIOPHYSICAL, W/NST: ICD-10-PCS | Mod: 26,S$PBB,, | Performed by: OBSTETRICS & GYNECOLOGY

## 2020-11-11 RX ORDER — HYDROXYPROGESTERONE CAPROATE 250 MG/ML
250 INJECTION INTRAMUSCULAR WEEKLY
Status: DISCONTINUED | OUTPATIENT
Start: 2020-11-11 | End: 2020-11-26 | Stop reason: HOSPADM

## 2020-11-11 RX ADMIN — HYDROXYPROGESTERONE CAPROATE 250 MG: 250 INJECTION INTRAMUSCULAR at 09:11

## 2020-11-11 NOTE — PROGRESS NOTES
Reports good FM.  US reviewed, VTX, EFW 39%, MVP 4.2, BPP 8 of 8.  Cayla today.  Advised GBS with RTC.  S/S PTL and normal FKC's reviewed.  Hydration stressed.

## 2020-11-17 ENCOUNTER — ROUTINE PRENATAL (OUTPATIENT)
Dept: OBSTETRICS AND GYNECOLOGY | Facility: CLINIC | Age: 30
End: 2020-11-17
Payer: MEDICAID

## 2020-11-17 VITALS
WEIGHT: 189.06 LBS | DIASTOLIC BLOOD PRESSURE: 84 MMHG | SYSTOLIC BLOOD PRESSURE: 118 MMHG | BODY MASS INDEX: 31.46 KG/M2

## 2020-11-17 DIAGNOSIS — Z3A.35 35 WEEKS GESTATION OF PREGNANCY: Primary | ICD-10-CM

## 2020-11-17 DIAGNOSIS — O34.219 PREVIOUS CESAREAN SECTION COMPLICATING PREGNANCY: ICD-10-CM

## 2020-11-17 DIAGNOSIS — Z87.51 HISTORY OF PRETERM DELIVERY: ICD-10-CM

## 2020-11-17 PROCEDURE — 99999 PR PBB SHADOW E&M-EST. PATIENT-LVL III: ICD-10-PCS | Mod: PBBFAC,,, | Performed by: ADVANCED PRACTICE MIDWIFE

## 2020-11-17 PROCEDURE — 99999 PR PBB SHADOW E&M-EST. PATIENT-LVL III: CPT | Mod: PBBFAC,,, | Performed by: ADVANCED PRACTICE MIDWIFE

## 2020-11-17 PROCEDURE — 99213 OFFICE O/P EST LOW 20 MIN: CPT | Mod: PBBFAC,TH,PN | Performed by: ADVANCED PRACTICE MIDWIFE

## 2020-11-17 PROCEDURE — 99213 PR OFFICE/OUTPT VISIT, EST, LEVL III, 20-29 MIN: ICD-10-PCS | Mod: TH,S$PBB,, | Performed by: ADVANCED PRACTICE MIDWIFE

## 2020-11-17 PROCEDURE — 87081 CULTURE SCREEN ONLY: CPT

## 2020-11-17 PROCEDURE — 99213 OFFICE O/P EST LOW 20 MIN: CPT | Mod: TH,S$PBB,, | Performed by: ADVANCED PRACTICE MIDWIFE

## 2020-11-17 RX ADMIN — HYDROXYPROGESTERONE CAPROATE 250 MG: 250 INJECTION INTRAMUSCULAR at 08:11

## 2020-11-20 LAB — BACTERIA SPEC AEROBE CULT: NORMAL

## 2020-11-21 ENCOUNTER — TELEPHONE (OUTPATIENT)
Dept: OBSTETRICS AND GYNECOLOGY | Facility: HOSPITAL | Age: 30
End: 2020-11-21

## 2020-11-21 PROBLEM — O14.93 PRE-ECLAMPSIA IN THIRD TRIMESTER: Status: ACTIVE | Noted: 2020-11-21

## 2020-11-22 ENCOUNTER — HOSPITAL ENCOUNTER (INPATIENT)
Facility: HOSPITAL | Age: 30
LOS: 4 days | Discharge: HOME | End: 2020-11-26
Attending: OBSTETRICS & GYNECOLOGY | Admitting: OBSTETRICS & GYNECOLOGY
Payer: MEDICAID

## 2020-11-22 DIAGNOSIS — R06.02 SOB (SHORTNESS OF BREATH): ICD-10-CM

## 2020-11-22 DIAGNOSIS — R10.9 RIGHT SIDED ABDOMINAL PAIN: ICD-10-CM

## 2020-11-22 DIAGNOSIS — Z98.891 S/P CESAREAN SECTION: ICD-10-CM

## 2020-11-22 DIAGNOSIS — O26.899 ABDOMINAL PAIN AFFECTING PREGNANCY: ICD-10-CM

## 2020-11-22 DIAGNOSIS — R10.9 ABDOMINAL PAIN AFFECTING PREGNANCY: ICD-10-CM

## 2020-11-22 DIAGNOSIS — N12 PYELONEPHRITIS: Primary | ICD-10-CM

## 2020-11-22 PROBLEM — Z3A.34 34 WEEKS GESTATION OF PREGNANCY: Status: RESOLVED | Noted: 2020-10-15 | Resolved: 2020-11-22

## 2020-11-22 PROCEDURE — 81000 URINALYSIS NONAUTO W/SCOPE: CPT

## 2020-11-22 PROCEDURE — 80053 COMPREHEN METABOLIC PANEL: CPT

## 2020-11-22 PROCEDURE — 11000001 HC ACUTE MED/SURG PRIVATE ROOM

## 2020-11-22 PROCEDURE — 85025 COMPLETE CBC W/AUTO DIFF WBC: CPT

## 2020-11-22 PROCEDURE — 83690 ASSAY OF LIPASE: CPT

## 2020-11-22 PROCEDURE — 82570 ASSAY OF URINE CREATININE: CPT

## 2020-11-22 PROCEDURE — 82150 ASSAY OF AMYLASE: CPT

## 2020-11-22 PROCEDURE — U0002 COVID-19 LAB TEST NON-CDC: HCPCS

## 2020-11-22 NOTE — PROGRESS NOTES
Reports good FM.  GBS obtained.  The skin of the suprapubic region was evaluated and appearsintact without lesions.  Counseled the patient to shower daily and to wash this area with an antibacterial soap such as Dial daily.  Advised her to not shave the hair from this area from now until after delivery.  I also counseled the patient to place antibacterial hand soap in all her bathrooms and kitchen at home to help facilitate proper hand hygiene practices before and after delivery.  Reviewed S/S PTL, pre -e warning S/S and normal FKC's.  [place] today.  Hydration stressed.  RTC 1 week .  Will obtain growth scan in 2 weeks if undelivered.

## 2020-11-23 PROBLEM — R10.9 RIGHT SIDED ABDOMINAL PAIN: Status: ACTIVE | Noted: 2020-11-23

## 2020-11-23 LAB
ALBUMIN SERPL BCP-MCNC: 2.8 G/DL (ref 3.5–5.2)
ALBUMIN SERPL BCP-MCNC: 2.8 G/DL (ref 3.5–5.2)
ALP SERPL-CCNC: 145 U/L (ref 55–135)
ALP SERPL-CCNC: 154 U/L (ref 55–135)
ALT SERPL W/O P-5'-P-CCNC: 11 U/L (ref 10–44)
ALT SERPL W/O P-5'-P-CCNC: 12 U/L (ref 10–44)
AMYLASE SERPL-CCNC: 96 U/L (ref 20–110)
ANION GAP SERPL CALC-SCNC: 11 MMOL/L (ref 8–16)
ANION GAP SERPL CALC-SCNC: 8 MMOL/L (ref 8–16)
AST SERPL-CCNC: 18 U/L (ref 10–40)
AST SERPL-CCNC: 18 U/L (ref 10–40)
BACTERIA #/AREA URNS HPF: ABNORMAL /HPF
BACTERIA #/AREA URNS HPF: ABNORMAL /HPF
BASOPHILS # BLD AUTO: 0.01 K/UL (ref 0–0.2)
BASOPHILS # BLD AUTO: 0.02 K/UL (ref 0–0.2)
BASOPHILS NFR BLD: 0.2 % (ref 0–1.9)
BASOPHILS NFR BLD: 0.3 % (ref 0–1.9)
BILIRUB SERPL-MCNC: 0.2 MG/DL (ref 0.1–1)
BILIRUB SERPL-MCNC: 0.3 MG/DL (ref 0.1–1)
BILIRUB UR QL STRIP: NEGATIVE
BILIRUB UR QL STRIP: NEGATIVE
BUN SERPL-MCNC: 10 MG/DL (ref 6–20)
BUN SERPL-MCNC: 7 MG/DL (ref 6–20)
CALCIUM SERPL-MCNC: 8.4 MG/DL (ref 8.7–10.5)
CALCIUM SERPL-MCNC: 9.4 MG/DL (ref 8.7–10.5)
CHLORIDE SERPL-SCNC: 105 MMOL/L (ref 95–110)
CHLORIDE SERPL-SCNC: 107 MMOL/L (ref 95–110)
CLARITY UR: ABNORMAL
CLARITY UR: CLEAR
CO2 SERPL-SCNC: 21 MMOL/L (ref 23–29)
CO2 SERPL-SCNC: 25 MMOL/L (ref 23–29)
COLOR UR: YELLOW
COLOR UR: YELLOW
CREAT SERPL-MCNC: 0.7 MG/DL (ref 0.5–1.4)
CREAT SERPL-MCNC: 0.7 MG/DL (ref 0.5–1.4)
CREAT UR-MCNC: 39.4 MG/DL (ref 15–325)
CREAT UR-MCNC: 98.2 MG/DL (ref 15–325)
DIFFERENTIAL METHOD: ABNORMAL
DIFFERENTIAL METHOD: ABNORMAL
EOSINOPHIL # BLD AUTO: 0 K/UL (ref 0–0.5)
EOSINOPHIL # BLD AUTO: 0 K/UL (ref 0–0.5)
EOSINOPHIL NFR BLD: 0.4 % (ref 0–8)
EOSINOPHIL NFR BLD: 0.4 % (ref 0–8)
ERYTHROCYTE [DISTWIDTH] IN BLOOD BY AUTOMATED COUNT: 13.9 % (ref 11.5–14.5)
ERYTHROCYTE [DISTWIDTH] IN BLOOD BY AUTOMATED COUNT: 14 % (ref 11.5–14.5)
EST. GFR  (AFRICAN AMERICAN): >60 ML/MIN/1.73 M^2
EST. GFR  (AFRICAN AMERICAN): >60 ML/MIN/1.73 M^2
EST. GFR  (NON AFRICAN AMERICAN): >60 ML/MIN/1.73 M^2
EST. GFR  (NON AFRICAN AMERICAN): >60 ML/MIN/1.73 M^2
GLUCOSE SERPL-MCNC: 85 MG/DL (ref 70–110)
GLUCOSE SERPL-MCNC: 98 MG/DL (ref 70–110)
GLUCOSE UR QL STRIP: NEGATIVE
GLUCOSE UR QL STRIP: NEGATIVE
HCT VFR BLD AUTO: 27.5 % (ref 37–48.5)
HCT VFR BLD AUTO: 27.7 % (ref 37–48.5)
HGB BLD-MCNC: 8.5 G/DL (ref 12–16)
HGB BLD-MCNC: 8.6 G/DL (ref 12–16)
HGB UR QL STRIP: ABNORMAL
HGB UR QL STRIP: ABNORMAL
IMM GRANULOCYTES # BLD AUTO: 0.05 K/UL (ref 0–0.04)
IMM GRANULOCYTES # BLD AUTO: 0.09 K/UL (ref 0–0.04)
IMM GRANULOCYTES NFR BLD AUTO: 0.9 % (ref 0–0.5)
IMM GRANULOCYTES NFR BLD AUTO: 1.2 % (ref 0–0.5)
KETONES UR QL STRIP: NEGATIVE
KETONES UR QL STRIP: NEGATIVE
LEUKOCYTE ESTERASE UR QL STRIP: ABNORMAL
LEUKOCYTE ESTERASE UR QL STRIP: ABNORMAL
LIPASE SERPL-CCNC: 26 U/L (ref 4–60)
LYMPHOCYTES # BLD AUTO: 0.8 K/UL (ref 1–4.8)
LYMPHOCYTES # BLD AUTO: 1.3 K/UL (ref 1–4.8)
LYMPHOCYTES NFR BLD: 14.9 % (ref 18–48)
LYMPHOCYTES NFR BLD: 16.4 % (ref 18–48)
MCH RBC QN AUTO: 25.4 PG (ref 27–31)
MCH RBC QN AUTO: 25.4 PG (ref 27–31)
MCHC RBC AUTO-ENTMCNC: 30.7 G/DL (ref 32–36)
MCHC RBC AUTO-ENTMCNC: 31.3 G/DL (ref 32–36)
MCV RBC AUTO: 81 FL (ref 82–98)
MCV RBC AUTO: 83 FL (ref 82–98)
MICROSCOPIC COMMENT: ABNORMAL
MICROSCOPIC COMMENT: ABNORMAL
MONOCYTES # BLD AUTO: 0.3 K/UL (ref 0.3–1)
MONOCYTES # BLD AUTO: 0.7 K/UL (ref 0.3–1)
MONOCYTES NFR BLD: 6.3 % (ref 4–15)
MONOCYTES NFR BLD: 8.7 % (ref 4–15)
NEUTROPHILS # BLD AUTO: 4.2 K/UL (ref 1.8–7.7)
NEUTROPHILS # BLD AUTO: 5.7 K/UL (ref 1.8–7.7)
NEUTROPHILS NFR BLD: 73 % (ref 38–73)
NEUTROPHILS NFR BLD: 77.3 % (ref 38–73)
NITRITE UR QL STRIP: NEGATIVE
NITRITE UR QL STRIP: POSITIVE
NRBC BLD-RTO: 0 /100 WBC
NRBC BLD-RTO: 0 /100 WBC
PH UR STRIP: 7 [PH] (ref 5–8)
PH UR STRIP: 7 [PH] (ref 5–8)
PLATELET # BLD AUTO: 138 K/UL (ref 150–350)
PLATELET # BLD AUTO: 146 K/UL (ref 150–350)
PMV BLD AUTO: 11.6 FL (ref 9.2–12.9)
PMV BLD AUTO: 12 FL (ref 9.2–12.9)
POTASSIUM SERPL-SCNC: 3.3 MMOL/L (ref 3.5–5.1)
POTASSIUM SERPL-SCNC: 3.5 MMOL/L (ref 3.5–5.1)
PROT SERPL-MCNC: 6.3 G/DL (ref 6–8.4)
PROT SERPL-MCNC: 6.5 G/DL (ref 6–8.4)
PROT UR QL STRIP: NEGATIVE
PROT UR QL STRIP: NEGATIVE
PROT UR-MCNC: 25 MG/DL (ref 0–15)
PROT UR-MCNC: 9 MG/DL (ref 0–15)
PROT/CREAT UR: 0.23 MG/G{CREAT} (ref 0–0.2)
PROT/CREAT UR: 0.25 MG/G{CREAT} (ref 0–0.2)
RBC # BLD AUTO: 3.35 M/UL (ref 4–5.4)
RBC # BLD AUTO: 3.39 M/UL (ref 4–5.4)
RBC #/AREA URNS HPF: 0 /HPF (ref 0–4)
RBC #/AREA URNS HPF: 3 /HPF (ref 0–4)
SARS-COV-2 RDRP RESP QL NAA+PROBE: NEGATIVE
SODIUM SERPL-SCNC: 138 MMOL/L (ref 136–145)
SODIUM SERPL-SCNC: 139 MMOL/L (ref 136–145)
SP GR UR STRIP: 1.01 (ref 1–1.03)
SP GR UR STRIP: 1.02 (ref 1–1.03)
SQUAMOUS #/AREA URNS HPF: 3 /HPF
URN SPEC COLLECT METH UR: ABNORMAL
URN SPEC COLLECT METH UR: ABNORMAL
UROBILINOGEN UR STRIP-ACNC: NEGATIVE EU/DL
UROBILINOGEN UR STRIP-ACNC: NEGATIVE EU/DL
WBC # BLD AUTO: 5.37 K/UL (ref 3.9–12.7)
WBC # BLD AUTO: 7.79 K/UL (ref 3.9–12.7)
WBC #/AREA URNS HPF: 5 /HPF (ref 0–5)
WBC #/AREA URNS HPF: 9 /HPF (ref 0–5)
WBC CLUMPS URNS QL MICRO: ABNORMAL

## 2020-11-23 PROCEDURE — 87040 BLOOD CULTURE FOR BACTERIA: CPT | Mod: 59

## 2020-11-23 PROCEDURE — 87086 URINE CULTURE/COLONY COUNT: CPT

## 2020-11-23 PROCEDURE — 80053 COMPREHEN METABOLIC PANEL: CPT

## 2020-11-23 PROCEDURE — 72100003 HC LABOR CARE, EA. ADDL. 8 HRS

## 2020-11-23 PROCEDURE — 81000 URINALYSIS NONAUTO W/SCOPE: CPT

## 2020-11-23 PROCEDURE — 63600175 PHARM REV CODE 636 W HCPCS: Performed by: OBSTETRICS & GYNECOLOGY

## 2020-11-23 PROCEDURE — 11000001 HC ACUTE MED/SURG PRIVATE ROOM

## 2020-11-23 PROCEDURE — 63600175 PHARM REV CODE 636 W HCPCS: Performed by: ADVANCED PRACTICE MIDWIFE

## 2020-11-23 PROCEDURE — 87077 CULTURE AEROBIC IDENTIFY: CPT

## 2020-11-23 PROCEDURE — 36415 COLL VENOUS BLD VENIPUNCTURE: CPT

## 2020-11-23 PROCEDURE — 87186 SC STD MICRODIL/AGAR DIL: CPT

## 2020-11-23 PROCEDURE — 85025 COMPLETE CBC W/AUTO DIFF WBC: CPT

## 2020-11-23 PROCEDURE — 72100002 HC LABOR CARE, 1ST 8 HOURS

## 2020-11-23 PROCEDURE — 87088 URINE BACTERIA CULTURE: CPT

## 2020-11-23 PROCEDURE — 93010 ELECTROCARDIOGRAM REPORT: CPT | Mod: ,,, | Performed by: INTERNAL MEDICINE

## 2020-11-23 PROCEDURE — 81003 URINALYSIS AUTO W/O SCOPE: CPT

## 2020-11-23 PROCEDURE — 93010 EKG 12-LEAD: ICD-10-PCS | Mod: ,,, | Performed by: INTERNAL MEDICINE

## 2020-11-23 PROCEDURE — 25000003 PHARM REV CODE 250: Performed by: OBSTETRICS & GYNECOLOGY

## 2020-11-23 PROCEDURE — 84156 ASSAY OF PROTEIN URINE: CPT

## 2020-11-23 PROCEDURE — 99211 OFF/OP EST MAY X REQ PHY/QHP: CPT | Mod: 25,TH

## 2020-11-23 PROCEDURE — 93005 ELECTROCARDIOGRAM TRACING: CPT

## 2020-11-23 RX ORDER — HYDROXYZINE HYDROCHLORIDE 25 MG/ML
50 INJECTION, SOLUTION INTRAMUSCULAR ONCE
Status: COMPLETED | OUTPATIENT
Start: 2020-11-23 | End: 2020-11-23

## 2020-11-23 RX ORDER — ACETAMINOPHEN 325 MG/1
650 TABLET ORAL EVERY 6 HOURS PRN
Status: DISCONTINUED | OUTPATIENT
Start: 2020-11-23 | End: 2020-11-24

## 2020-11-23 RX ORDER — SODIUM CHLORIDE, SODIUM LACTATE, POTASSIUM CHLORIDE, CALCIUM CHLORIDE 600; 310; 30; 20 MG/100ML; MG/100ML; MG/100ML; MG/100ML
INJECTION, SOLUTION INTRAVENOUS CONTINUOUS
Status: DISCONTINUED | OUTPATIENT
Start: 2020-11-23 | End: 2020-11-24

## 2020-11-23 RX ORDER — HYDROXYZINE PAMOATE 25 MG/1
50 CAPSULE ORAL ONCE
Status: DISCONTINUED | OUTPATIENT
Start: 2020-11-23 | End: 2020-11-23

## 2020-11-23 RX ADMIN — SODIUM CHLORIDE, SODIUM LACTATE, POTASSIUM CHLORIDE, AND CALCIUM CHLORIDE: .6; .31; .03; .02 INJECTION, SOLUTION INTRAVENOUS at 07:11

## 2020-11-23 RX ADMIN — CEFTRIAXONE 2 G: 2 INJECTION, SOLUTION INTRAVENOUS at 09:11

## 2020-11-23 RX ADMIN — SODIUM CHLORIDE, SODIUM LACTATE, POTASSIUM CHLORIDE, AND CALCIUM CHLORIDE 1000 ML: .6; .31; .03; .02 INJECTION, SOLUTION INTRAVENOUS at 08:11

## 2020-11-23 RX ADMIN — ACETAMINOPHEN 650 MG: 325 TABLET ORAL at 09:11

## 2020-11-23 RX ADMIN — SODIUM CHLORIDE, SODIUM LACTATE, POTASSIUM CHLORIDE, AND CALCIUM CHLORIDE: .6; .31; .03; .02 INJECTION, SOLUTION INTRAVENOUS at 12:11

## 2020-11-23 RX ADMIN — ACETAMINOPHEN 650 MG: 325 TABLET ORAL at 12:11

## 2020-11-23 RX ADMIN — HYDROXYZINE HYDROCHLORIDE 50 MG: 25 INJECTION, SOLUTION INTRAMUSCULAR at 03:11

## 2020-11-23 NOTE — PROGRESS NOTES
[facility]  Obstetrics  Labor Progress Note    Patient Name: Arabella Gibbs  MRN: [id number]  Admission Date: 2020  Hospital Length of Stay: 0 days  Attending Physician: Tamela Banks MD  Primary Care Provider: Primary Doctor No    Subjective:     Principal Problem:Abdominal pain affecting pregnancy    Hospital Course:  EFM / NST  Pre-e labs, PCR, covid swab  Chest x-ray / EKG   hrs pt continues to c/o of abdominal pain to R side, pain is on outer R side, pt to remain NPO, repeat Pre E work up per Dr Romero    Interval History:  Arabella is a 30 y.o.  at 35w5d. She is doing ok.     Objective:     Vital Signs (Most Recent):  Temp: 100.2 °F (37.9 °C) (20 0750)  Pulse: 108 (20 0600)  BP: 127/70 (20 0600)  SpO2: 100 % (20 0600) Vital Signs (24h Range):  Temp:  [99 °F (37.2 °C)-100.2 °F (37.9 °C)] 100.2 °F (37.9 °C)  Pulse:  [] 108  SpO2:  [97 %-100 %] 100 %  BP: (108-134)/(61-94) 127/70        There is no height or weight on file to calculate BMI.    FHT: Cat 1 (reassuring)  TOCO:  occasional ctx's    Cervical Exam:  Dilation:    Effacement:    Station:   Presentation:      Significant Labs:  Lab Results   Component Value Date    GROUPTRH O POS 2020    HEPBSAG Negative 2020    STREPBCULT No Group B Streptococcus isolated 2020       I have personallly reviewed all pertinent lab results from the last 24 hours.  Recent Lab Results       20  2320   20  2315   20  2300        Albumin 2.8         Alkaline Phosphatase 154         ALT 11         Amylase 96         Anion Gap 11         Appearance, UA     Clear     AST 18         Bacteria, UA     Few     Baso # 0.02         Basophil % 0.3         Bilirubin (UA)     Negative     BILIRUBIN TOTAL 0.2  Comment:  For infants and newborns, interpretation of results should be based  on gestational age, weight and in agreement with clinical  observations.  Premature Infant  recommended reference ranges:  Up to 24 hours.............<8.0 mg/dL  Up to 48 hours............<12.0 mg/dL  3-5 days..................<15.0 mg/dL  6-29 days.................<15.0 mg/dL           BUN 10         Calcium 9.4         Chloride 107         CO2 21         Color, UA     Yellow     Creatinine 0.7         Creatinine, Urine     39.4  Comment:  The random urine reference ranges provided were established   for 24 hour urine collections.  No reference ranges exist for  random urine specimens.  Correlate clinically.       Differential Method Automated         eGFR if  >60         eGFR if non  >60  Comment:  Calculation used to obtain the estimated glomerular filtration  rate (eGFR) is the CKD-EPI equation.            Eos # 0.0         Eosinophil % 0.4         Glucose 98         Glucose, UA     Negative     Gran # (ANC) 5.7         Gran % 73.0         Hematocrit 27.5         Hemoglobin 8.6         Immature Grans (Abs) 0.09  Comment:  Mild elevation in immature granulocytes is non specific and   can be seen in a variety of conditions including stress response,   acute inflammation, trauma and pregnancy. Correlation with other   laboratory and clinical findings is essential.           Immature Granulocytes 1.2         Ketones, UA     Negative     Leukocytes, UA     1+     Lipase 26         Lymph # 1.3         Lymph % 16.4         MCH 25.4         MCHC 31.3         MCV 81         Microscopic Comment     SEE COMMENT  Comment:  Other formed elements not mentioned in the report are not   present in the microscopic examination.        Mono # 0.7         Mono % 8.7         MPV 11.6         NITRITE UA     Negative     nRBC 0         Occult Blood UA     Trace     pH, UA     7.0     Platelets 146         Potassium 3.5         Prot/Creat Ratio, Urine     0.23     PROTEIN TOTAL 6.5         Protein, UA     Negative  Comment:  Recommend a 24 hour urine protein or a urine   protein/creatinine ratio  if globulin induced proteinuria is  clinically suspected.       Protein, Urine Random     9  Comment:  The random urine reference ranges provided were established   for 24 hour urine collections.  No reference ranges exist for  random urine specimens.  Correlate clinically.       RBC 3.39         RBC, UA     3     RDW 13.9         SARS-CoV-2 RNA, Amplification, Qual   Negative  Comment:  This test utilizes isothermal nucleic acid amplification   technology to detect the SARS-CoV-2 RdRp nucleic acid segment.   The analytical sensitivity (limit of detection) is 125 genome   equivalents/mL.   A POSITIVE result implies infection with the SARS-CoV-2 virus;  the patient is presumed to be contagious.    A NEGATIVE result means that SARS-CoV-2 nucleic acids are not  present above the limit of detection. A NEGATIVE result should be   treated as presumptive. It does not rule out the possibility of   COVID-19 and should not be the sole basis for treatment decisions.   If COVID-19 is strongly suspected based on clinical and exposure   history, re-testing using an alternate molecular assay should be   considered.   This test is only for use under the Food and Drug   Administration s Emergency Use Authorization (EUA).   Commercial kits are provided by [employer].   Performance characteristics of the EUA have been independently  verified by [facility] Department of  Pathology and Laboratory Medicine.   _________________________________________________________________  The ID NOW COVID-19 Letter of Authorization, along with the   authorized Fact Sheet for Healthcare Providers, the authorized Fact  Sheet for Patients, and authorized labeling are available on the FDA   website:  www.fda.gov/MedicalDevices/Safety/EmergencySituations/xhf213650.htm         Sodium 139         Specific [place], UA     1.010     Specimen UA     Urine, Clean Catch     Squam Epithel, UA     3     UROBILINOGEN UA     Negative     WBC  Clumps, UA     Rare     WBC, UA     9     WBC 7.79               Physical Exam:   Constitutional: She is oriented to person, place, and time. She appears well-developed and well-nourished.    HENT:   Head: Normocephalic.     Neck: Normal range of motion.     Pulmonary/Chest: Effort normal.        Abdominal: Soft. There is abdominal tenderness.   Tenderness noted on palpation to outer right side             Musculoskeletal: Normal range of motion and moves all extremeties.       Neurological: She is alert and oriented to person, place, and time.    Skin: Skin is warm and dry.    Psychiatric: She has a normal mood and affect. Her behavior is normal. Judgment and thought content normal.       Assessment/Plan:     30 y.o. female  at 35w5d for:    * Abdominal pain affecting pregnancy  EFM / NST  Pre-e labs, PCR, covid swab  Chest x-ray / EKG   hrs pt continues to c/o of abdominal pain to R side, pain is on outer R side, pt to remain NPO, repeat Pre E work up per Dr Romero      SOB (shortness of breath)  Chest x-ray-WNL   covid screen-Neg            Maxwell Tillman, AUREA  Obstetrics  [facility]

## 2020-11-23 NOTE — H&P
[facility] -   Obstetrics  History & Physical    Patient Name: Arabella Gibbs  MRN: [id number]  Admission Date: 2020  Primary Care Provider: Primary Doctor No    Subjective:     Principal Problem:Abdominal pain affecting pregnancy    History of Present Illness:  Presents with C/O RUQ pain and SOB worsening this pm    Obstetric HPI:  Patient reports occasional contractions, active fetal movement, No vaginal bleeding , No loss of fluid     This pregnancy has been complicated by pre-e, previous  x's 4    OB History    Para Term  AB Living   6 4 2 2 1 4   SAB TAB Ectopic Multiple Live Births   1 0 0 0 4      # Outcome Date GA Lbr Mark/2nd Weight Sex Delivery Anes PTL Lv   6 Current            5  18 36w0d  2.268 kg (5 lb) M CS-LVertical Spinal Y ANN      Name: Li   4  16 35w4d  2.54 kg (5 lb 9.6 oz) F CS-LTranv Spinal Y ANN      Name: KARIN, JOSH DEXTER      Apgar1: 8  Apgar5: 9   3 SAB 2016           2 Term 13 39w0d  3.374 kg (7 lb 7 oz) M CS-LTranv Spinal  ANN      Name: Omar   1 Term 09 39w0d  2.722 kg (6 lb) M CS-LTranv EPI N ANN      Complications: Failure to Progress in First Stage      Name: Marita     Past Medical History:   Diagnosis Date    Abnormal Pap smear of cervix     colpo 2013    Anxiety     chest pain anxiety last used meds 2012    Pre-eclampsia in third trimester 2020     Past Surgical History:   Procedure Laterality Date     SECTION      x4       Facility-Administered Medications Prior to Admission   Medication    HYDROXYprogest(PF)(preg presv) AtIn 250 mg    HYDROXYprogesterone Oil 250 mg     PTA Medications   Medication Sig    ferrous sulfate 325 (65 FE) MG EC tablet Take 1 tablet (325 mg total) by mouth 2 (two) times daily.    metroNIDAZOLE (FLAGYL) 500 MG tablet Take 1 tablet (500 mg total) by mouth every 12 (twelve) hours.    ondansetron (ZOFRAN-ODT) 8 MG TbDL Take 1 tablet (8 mg  total) by mouth every 6 (six) hours as needed.    prenatal vit/iron fum/folic ac (PRENATAL 1+1 ORAL) Take by mouth.    promethazine (PHENERGAN) 25 MG tablet Take 1 tablet (25 mg total) by mouth every 6 (six) hours as needed for Nausea.       Review of patient's allergies indicates:  No Known Allergies     Family History     None        Tobacco Use    Smoking status: Never Smoker    Smokeless tobacco: Never Used   Substance and Sexual Activity    Alcohol use: No    Drug use: No    Sexual activity: Yes     Partners: Male     Review of Systems   Respiratory: Positive for shortness of breath.    Gastrointestinal:        RUQ pain   Neurological: Positive for headaches.   All other systems reviewed and are negative.     Objective:     Vital Signs (Most Recent):    Vital Signs (24h Range):           There is no height or weight on file to calculate BMI.    FHT: applied  TOCO: Applied    Physical Exam:   Constitutional: She is oriented to person, place, and time. She appears well-developed and well-nourished.       Cardiovascular: Normal rate and regular rhythm.     Pulmonary/Chest:   Slight increased respiratory effort        Abdominal: Soft.     Genitourinary:    Uterus normal.             Musculoskeletal: Normal range of motion and moves all extremeties.       Neurological: She is alert and oriented to person, place, and time.    Skin: Skin is warm and dry.    Psychiatric: She has a normal mood and affect. Her behavior is normal. Thought content normal.         Cervix deferred     Significant Labs:  Lab Results   Component Value Date    GROUPTRH O POS 2020    HEPBSAG Negative 2020    STREPBCULT No Group B Streptococcus isolated 2020     Lab orders placed  I have personallly reviewed all pertinent lab results from the last 24 hours.    Assessment/Plan:     30 y.o. female  at 35w4d for:    No notes have been filed under this hospital service.  Service: Obstetrics and Gynecology      Abida IVEY  AUREA Carpenter  Obstetrics  [facility]

## 2020-11-23 NOTE — SUBJECTIVE & OBJECTIVE
Interval History:  Arabella is a 30 y.o.  at 35w5d. She is doing ok.     Objective:     Vital Signs (Most Recent):  Temp: 100.2 °F (37.9 °C) (20 0750)  Pulse: 108 (20 0600)  BP: 127/70 (20 0600)  SpO2: 100 % (20 0600) Vital Signs (24h Range):  Temp:  [99 °F (37.2 °C)-100.2 °F (37.9 °C)] 100.2 °F (37.9 °C)  Pulse:  [] 108  SpO2:  [97 %-100 %] 100 %  BP: (108-134)/(61-94) 127/70        There is no height or weight on file to calculate BMI.    FHT: Cat 1 (reassuring)  TOCO:  occasional ctx's    Cervical Exam:  Dilation:    Effacement:    Station:   Presentation:      Significant Labs:  Lab Results   Component Value Date    GROUPTRH O POS 2020    HEPBSAG Negative 2020    STREPBCULT No Group B Streptococcus isolated 2020       I have personallly reviewed all pertinent lab results from the last 24 hours.  Recent Lab Results       20  2320   20  2315   20  2300        Albumin 2.8         Alkaline Phosphatase 154         ALT 11         Amylase 96         Anion Gap 11         Appearance, UA     Clear     AST 18         Bacteria, UA     Few     Baso # 0.02         Basophil % 0.3         Bilirubin (UA)     Negative     BILIRUBIN TOTAL 0.2  Comment:  For infants and newborns, interpretation of results should be based  on gestational age, weight and in agreement with clinical  observations.  Premature Infant recommended reference ranges:  Up to 24 hours.............<8.0 mg/dL  Up to 48 hours............<12.0 mg/dL  3-5 days..................<15.0 mg/dL  6-29 days.................<15.0 mg/dL           BUN 10         Calcium 9.4         Chloride 107         CO2 21         Color, UA     Yellow     Creatinine 0.7         Creatinine, Urine     39.4  Comment:  The random urine reference ranges provided were established   for 24 hour urine collections.  No reference ranges exist for  random urine specimens.  Correlate clinically.       Differential Method Automated          eGFR if  >60         eGFR if non  >60  Comment:  Calculation used to obtain the estimated glomerular filtration  rate (eGFR) is the CKD-EPI equation.            Eos # 0.0         Eosinophil % 0.4         Glucose 98         Glucose, UA     Negative     Gran # (ANC) 5.7         Gran % 73.0         Hematocrit 27.5         Hemoglobin 8.6         Immature Grans (Abs) 0.09  Comment:  Mild elevation in immature granulocytes is non specific and   can be seen in a variety of conditions including stress response,   acute inflammation, trauma and pregnancy. Correlation with other   laboratory and clinical findings is essential.           Immature Granulocytes 1.2         Ketones, UA     Negative     Leukocytes, UA     1+     Lipase 26         Lymph # 1.3         Lymph % 16.4         MCH 25.4         MCHC 31.3         MCV 81         Microscopic Comment     SEE COMMENT  Comment:  Other formed elements not mentioned in the report are not   present in the microscopic examination.        Mono # 0.7         Mono % 8.7         MPV 11.6         NITRITE UA     Negative     nRBC 0         Occult Blood UA     Trace     pH, UA     7.0     Platelets 146         Potassium 3.5         Prot/Creat Ratio, Urine     0.23     PROTEIN TOTAL 6.5         Protein, UA     Negative  Comment:  Recommend a 24 hour urine protein or a urine   protein/creatinine ratio if globulin induced proteinuria is  clinically suspected.       Protein, Urine Random     9  Comment:  The random urine reference ranges provided were established   for 24 hour urine collections.  No reference ranges exist for  random urine specimens.  Correlate clinically.       RBC 3.39         RBC, UA     3     RDW 13.9         SARS-CoV-2 RNA, Amplification, Qual   Negative  Comment:  This test utilizes isothermal nucleic acid amplification   technology to detect the SARS-CoV-2 RdRp nucleic acid segment.   The analytical sensitivity (limit of  detection) is 125 genome   equivalents/mL.   A POSITIVE result implies infection with the SARS-CoV-2 virus;  the patient is presumed to be contagious.    A NEGATIVE result means that SARS-CoV-2 nucleic acids are not  present above the limit of detection. A NEGATIVE result should be   treated as presumptive. It does not rule out the possibility of   COVID-19 and should not be the sole basis for treatment decisions.   If COVID-19 is strongly suspected based on clinical and exposure   history, re-testing using an alternate molecular assay should be   considered.   This test is only for use under the Food and Drug   Administration s Emergency Use Authorization (EUA).   Commercial kits are provided by [employer].   Performance characteristics of the EUA have been independently  verified by [facility] Department of  Pathology and Laboratory Medicine.   _________________________________________________________________  The ID NOW COVID-19 Letter of Authorization, along with the   authorized Fact Sheet for Healthcare Providers, the authorized Fact  Sheet for Patients, and authorized labeling are available on the FDA   website:  www.fda.gov/MedicalDevices/Safety/EmergencySituations/itf163113.htm         Sodium 139         Specific [place], UA     1.010     Specimen UA     Urine, Clean Catch     Squam Epithel, UA     3     UROBILINOGEN UA     Negative     WBC Clumps, UA     Rare     WBC, UA     9     WBC 7.79               Physical Exam:   Constitutional: She is oriented to person, place, and time. She appears well-developed and well-nourished.    HENT:   Head: Normocephalic.     Neck: Normal range of motion.     Pulmonary/Chest: Effort normal.        Abdominal: Soft. There is abdominal tenderness.   Tenderness noted on palpation to outer right side             Musculoskeletal: Normal range of motion and moves all extremeties.       Neurological: She is alert and oriented to person, place, and time.     Skin: Skin is warm and dry.    Psychiatric: She has a normal mood and affect. Her behavior is normal. Judgment and thought content normal.

## 2020-11-23 NOTE — SUBJECTIVE & OBJECTIVE
Obstetric HPI:  Patient reports occasional contractions, active fetal movement, No vaginal bleeding , No loss of fluid     This pregnancy has been complicated by pre-e, previous  x's 4    OB History    Para Term  AB Living   6 4 2 2 1 4   SAB TAB Ectopic Multiple Live Births   1 0 0 0 4      # Outcome Date GA Lbr Mark/2nd Weight Sex Delivery Anes PTL Lv   6 Current            5  18 36w0d  2.268 kg (5 lb) M CS-LVertical Spinal Y ANN      Name: Li   4  16 35w4d  2.54 kg (5 lb 9.6 oz) F CS-LTranv Spinal Y ANN      Name: KARIN, GIRL ISACC      Apgar1: 8  Apgar5: 9   3 SAB            2 Term 13 39w0d  3.374 kg (7 lb 7 oz) M CS-LTranv Spinal  ANN      Name: Omar   1 Term 09 39w0d  2.722 kg (6 lb) M CS-LTranv EPI N ANN      Complications: Failure to Progress in First Stage      Name: Marita     Past Medical History:   Diagnosis Date    Abnormal Pap smear of cervix     colpo 2013    Anxiety     chest pain anxiety last used meds 2012    Pre-eclampsia in third trimester 2020     Past Surgical History:   Procedure Laterality Date     SECTION      x4       Facility-Administered Medications Prior to Admission   Medication    HYDROXYprogest(PF)(preg presv) AtIn 250 mg    HYDROXYprogesterone Oil 250 mg     PTA Medications   Medication Sig    ferrous sulfate 325 (65 FE) MG EC tablet Take 1 tablet (325 mg total) by mouth 2 (two) times daily.    metroNIDAZOLE (FLAGYL) 500 MG tablet Take 1 tablet (500 mg total) by mouth every 12 (twelve) hours.    ondansetron (ZOFRAN-ODT) 8 MG TbDL Take 1 tablet (8 mg total) by mouth every 6 (six) hours as needed.    prenatal vit/iron fum/folic ac (PRENATAL 1+1 ORAL) Take by mouth.    promethazine (PHENERGAN) 25 MG tablet Take 1 tablet (25 mg total) by mouth every 6 (six) hours as needed for Nausea.       Review of patient's allergies indicates:  No Known Allergies     Family History     None         Tobacco Use    Smoking status: Never Smoker    Smokeless tobacco: Never Used   Substance and Sexual Activity    Alcohol use: No    Drug use: No    Sexual activity: Yes     Partners: Male     Review of Systems   Respiratory: Positive for shortness of breath.    Gastrointestinal:        RUQ pain   Neurological: Positive for headaches.   All other systems reviewed and are negative.     Objective:     Vital Signs (Most Recent):    Vital Signs (24h Range):           There is no height or weight on file to calculate BMI.    FHT: applied  TOCO: Applied    Physical Exam:   Constitutional: She is oriented to person, place, and time. She appears well-developed and well-nourished.       Cardiovascular: Normal rate and regular rhythm.     Pulmonary/Chest:   Slight increased respiratory effort        Abdominal: Soft.     Genitourinary:    Uterus normal.             Musculoskeletal: Normal range of motion and moves all extremeties.       Neurological: She is alert and oriented to person, place, and time.    Skin: Skin is warm and dry.    Psychiatric: She has a normal mood and affect. Her behavior is normal. Thought content normal.         Cervix deferred     Significant Labs:  Lab Results   Component Value Date    GROUPTRH O POS 05/05/2020    HEPBSAG Negative 05/05/2020    STREPBCULT No Group B Streptococcus isolated 11/17/2020     Lab orders placed  I have personallly reviewed all pertinent lab results from the last 24 hours.

## 2020-11-23 NOTE — PROGRESS NOTES
FHT strip reassuring, occasional contractions.  Cervix remains 1 thick.  Continues to complain of upper right quadrant pain that is worsening.  Amylase / lipase ordered.  Abdominal limited / gallbladder US ordered.  Vistaril 50 mg IM.

## 2020-11-23 NOTE — ASSESSMENT & PLAN NOTE
EFM / NST  Pre-e labs, PCR, covid swab  Chest x-ray / EKG  11/23/ hrs pt continues to c/o of abdominal pain to R side, pain is on outer R side, pt to remain NPO, repeat Pre E work up per Dr Romero

## 2020-11-23 NOTE — HOSPITAL COURSE
EFM / NST  Pre-e labs, PCR, covid swab  Chest x-ray / EKG   hrs pt continues to c/o of abdominal pain to R side, pain is on outer R side, pt to remain NPO, repeat Pre E work up per Dr Romero    11:14 AM dopplers negative for DVT bilaterally.    2pm and 6pm report FHTs 150s  Called at 11:30pm regarding FHTs tachycardic up to 200. Came to assess pt.   Complains now of upper abdominal pain, constant. No fever, no maternal tachycardia.   Pt was afebrile with no c/o chills  D/w MFM James, agree, recommend to proceed w delivery as there is concern for chorioamnionitis vs impending uterine rupture.     (as an aside 2 other emergency deliveries arrived as delivery personal for this delivery was being organized).     2020   Pre-operative Diagnosis:  Four previous  deliveries, 35w6d, with pyelonephritis, fetal tachycardia, abdominal pain, desires permanent sterilization with bilateral partial salpingectomy  Post-operative Diagnosis: same baby girl 6'10  PROCEDURE:   repeat low transverse  section with bilateral partial salpingectomy  EBL 400mL and no complications       Transferred to mother baby unit for routine post partum care. Patient progressed well postoperatively without complication.

## 2020-11-24 ENCOUNTER — ANESTHESIA EVENT (OUTPATIENT)
Dept: OBSTETRICS AND GYNECOLOGY | Facility: HOSPITAL | Age: 30
End: 2020-11-24
Payer: MEDICAID

## 2020-11-24 ENCOUNTER — ANESTHESIA (OUTPATIENT)
Dept: OBSTETRICS AND GYNECOLOGY | Facility: HOSPITAL | Age: 30
End: 2020-11-24
Payer: MEDICAID

## 2020-11-24 PROBLEM — D50.9 IRON DEFICIENCY ANEMIA: Status: ACTIVE | Noted: 2020-11-24

## 2020-11-24 PROBLEM — N12 PYELONEPHRITIS: Status: ACTIVE | Noted: 2020-11-24

## 2020-11-24 PROBLEM — Z98.891 S/P CESAREAN SECTION: Status: ACTIVE | Noted: 2020-11-24

## 2020-11-24 PROCEDURE — 63600175 PHARM REV CODE 636 W HCPCS: Performed by: OBSTETRICS & GYNECOLOGY

## 2020-11-24 PROCEDURE — 25000003 PHARM REV CODE 250: Performed by: OBSTETRICS & GYNECOLOGY

## 2020-11-24 PROCEDURE — S0030 INJECTION, METRONIDAZOLE: HCPCS | Performed by: OBSTETRICS & GYNECOLOGY

## 2020-11-24 PROCEDURE — 37000008 HC ANESTHESIA 1ST 15 MINUTES: Performed by: OBSTETRICS & GYNECOLOGY

## 2020-11-24 PROCEDURE — 36004725 HC OB OR TIME LEV III - EA ADD 15 MIN: Mod: SZN

## 2020-11-24 PROCEDURE — 36004724 HC OB OR TIME LEV III - 1ST 15 MIN: Performed by: OBSTETRICS & GYNECOLOGY

## 2020-11-24 PROCEDURE — 88302 PR  SURG PATH,LEVEL II: ICD-10-PCS | Mod: 26,,, | Performed by: PATHOLOGY

## 2020-11-24 PROCEDURE — 37000009 HC ANESTHESIA EA ADD 15 MINS: Mod: SZN

## 2020-11-24 PROCEDURE — 71000039 HC RECOVERY, EACH ADD'L HOUR: Performed by: OBSTETRICS & GYNECOLOGY

## 2020-11-24 PROCEDURE — 59514 CESAREAN DELIVERY ONLY: CPT | Mod: AT,,, | Performed by: OBSTETRICS & GYNECOLOGY

## 2020-11-24 PROCEDURE — 88302 TISSUE EXAM BY PATHOLOGIST: CPT | Mod: 26,,, | Performed by: PATHOLOGY

## 2020-11-24 PROCEDURE — 37000009 HC ANESTHESIA EA ADD 15 MINS: Performed by: OBSTETRICS & GYNECOLOGY

## 2020-11-24 PROCEDURE — 59514 PR CESAREAN DELIVERY ONLY: ICD-10-PCS | Mod: AT,,, | Performed by: OBSTETRICS & GYNECOLOGY

## 2020-11-24 PROCEDURE — 58611 PR LIGATION,FALLOPIAN TUBE W/C-SECTION: ICD-10-PCS | Mod: ,,, | Performed by: OBSTETRICS & GYNECOLOGY

## 2020-11-24 PROCEDURE — 72100003 HC LABOR CARE, EA. ADDL. 8 HRS

## 2020-11-24 PROCEDURE — 88302 TISSUE EXAM BY PATHOLOGIST: CPT | Mod: 59 | Performed by: PATHOLOGY

## 2020-11-24 PROCEDURE — 11000001 HC ACUTE MED/SURG PRIVATE ROOM

## 2020-11-24 PROCEDURE — 71000033 HC RECOVERY, INTIAL HOUR: Performed by: OBSTETRICS & GYNECOLOGY

## 2020-11-24 PROCEDURE — 36004725 HC OB OR TIME LEV III - EA ADD 15 MIN: Performed by: OBSTETRICS & GYNECOLOGY

## 2020-11-24 PROCEDURE — 63600175 PHARM REV CODE 636 W HCPCS: Performed by: ANESTHESIOLOGY

## 2020-11-24 PROCEDURE — 27200688 HC TRAY, SPINAL-HYPER/ ISOBARIC: Performed by: ANESTHESIOLOGY

## 2020-11-24 PROCEDURE — 58611 LIGATE OVIDUCT(S) ADD-ON: CPT | Mod: ,,, | Performed by: OBSTETRICS & GYNECOLOGY

## 2020-11-24 PROCEDURE — 25000003 PHARM REV CODE 250: Performed by: ANESTHESIOLOGY

## 2020-11-24 RX ORDER — SIMETHICONE 80 MG
1 TABLET,CHEWABLE ORAL EVERY 6 HOURS PRN
Status: DISCONTINUED | OUTPATIENT
Start: 2020-11-24 | End: 2020-11-26 | Stop reason: HOSPADM

## 2020-11-24 RX ORDER — SODIUM CHLORIDE, SODIUM LACTATE, POTASSIUM CHLORIDE, CALCIUM CHLORIDE 600; 310; 30; 20 MG/100ML; MG/100ML; MG/100ML; MG/100ML
INJECTION, SOLUTION INTRAVENOUS CONTINUOUS
Status: ACTIVE | OUTPATIENT
Start: 2020-11-24 | End: 2020-11-24

## 2020-11-24 RX ORDER — DIPHENHYDRAMINE HCL 25 MG
25 CAPSULE ORAL EVERY 4 HOURS PRN
Status: DISCONTINUED | OUTPATIENT
Start: 2020-11-24 | End: 2020-11-26 | Stop reason: HOSPADM

## 2020-11-24 RX ORDER — MORPHINE SULFATE 1 MG/ML
INJECTION, SOLUTION EPIDURAL; INTRATHECAL; INTRAVENOUS
Status: DISCONTINUED | OUTPATIENT
Start: 2020-11-24 | End: 2020-11-24

## 2020-11-24 RX ORDER — HYDROCORTISONE 25 MG/G
CREAM TOPICAL 3 TIMES DAILY PRN
Status: DISCONTINUED | OUTPATIENT
Start: 2020-11-24 | End: 2020-11-26 | Stop reason: HOSPADM

## 2020-11-24 RX ORDER — BISACODYL 10 MG
10 SUPPOSITORY, RECTAL RECTAL ONCE AS NEEDED
Status: DISCONTINUED | OUTPATIENT
Start: 2020-11-24 | End: 2020-11-26 | Stop reason: HOSPADM

## 2020-11-24 RX ORDER — KETOROLAC TROMETHAMINE 30 MG/ML
INJECTION, SOLUTION INTRAMUSCULAR; INTRAVENOUS
Status: DISCONTINUED | OUTPATIENT
Start: 2020-11-24 | End: 2020-11-24

## 2020-11-24 RX ORDER — ONDANSETRON 8 MG/1
8 TABLET, ORALLY DISINTEGRATING ORAL EVERY 8 HOURS PRN
Status: DISCONTINUED | OUTPATIENT
Start: 2020-11-24 | End: 2020-11-26 | Stop reason: HOSPADM

## 2020-11-24 RX ORDER — BUPIVACAINE HYDROCHLORIDE 7.5 MG/ML
INJECTION, SOLUTION INTRASPINAL
Status: DISCONTINUED | OUTPATIENT
Start: 2020-11-24 | End: 2020-11-24

## 2020-11-24 RX ORDER — OXYTOCIN/RINGER'S LACTATE 30/500 ML
41.65 PLASTIC BAG, INJECTION (ML) INTRAVENOUS CONTINUOUS
Status: DISPENSED | OUTPATIENT
Start: 2020-11-24 | End: 2020-11-24

## 2020-11-24 RX ORDER — SODIUM CHLORIDE, SODIUM LACTATE, POTASSIUM CHLORIDE, CALCIUM CHLORIDE 600; 310; 30; 20 MG/100ML; MG/100ML; MG/100ML; MG/100ML
INJECTION, SOLUTION INTRAVENOUS CONTINUOUS PRN
Status: DISCONTINUED | OUTPATIENT
Start: 2020-11-24 | End: 2020-11-24

## 2020-11-24 RX ORDER — HYDROCODONE BITARTRATE AND ACETAMINOPHEN 5; 325 MG/1; MG/1
1 TABLET ORAL EVERY 4 HOURS PRN
Status: DISCONTINUED | OUTPATIENT
Start: 2020-11-24 | End: 2020-11-25

## 2020-11-24 RX ORDER — DOCUSATE SODIUM 100 MG/1
200 CAPSULE, LIQUID FILLED ORAL 2 TIMES DAILY PRN
Status: DISCONTINUED | OUTPATIENT
Start: 2020-11-24 | End: 2020-11-26 | Stop reason: HOSPADM

## 2020-11-24 RX ORDER — CHLORHEXIDINE GLUCONATE ORAL RINSE 1.2 MG/ML
10 SOLUTION DENTAL 2 TIMES DAILY
Status: DISCONTINUED | OUTPATIENT
Start: 2020-11-24 | End: 2020-11-26 | Stop reason: HOSPADM

## 2020-11-24 RX ORDER — AMOXICILLIN 250 MG
1 CAPSULE ORAL NIGHTLY
Status: DISCONTINUED | OUTPATIENT
Start: 2020-11-24 | End: 2020-11-26 | Stop reason: HOSPADM

## 2020-11-24 RX ORDER — IBUPROFEN 600 MG/1
600 TABLET ORAL EVERY 6 HOURS
Status: DISCONTINUED | OUTPATIENT
Start: 2020-11-24 | End: 2020-11-26 | Stop reason: HOSPADM

## 2020-11-24 RX ORDER — OXYTOCIN/RINGER'S LACTATE 30/500 ML
PLASTIC BAG, INJECTION (ML) INTRAVENOUS CONTINUOUS PRN
Status: DISCONTINUED | OUTPATIENT
Start: 2020-11-24 | End: 2020-11-24

## 2020-11-24 RX ORDER — PHENYLEPHRINE HYDROCHLORIDE 10 MG/ML
INJECTION INTRAVENOUS
Status: DISCONTINUED | OUTPATIENT
Start: 2020-11-24 | End: 2020-11-24

## 2020-11-24 RX ORDER — ADHESIVE BANDAGE
30 BANDAGE TOPICAL 2 TIMES DAILY PRN
Status: DISCONTINUED | OUTPATIENT
Start: 2020-11-25 | End: 2020-11-26 | Stop reason: HOSPADM

## 2020-11-24 RX ORDER — HYDROCODONE BITARTRATE AND ACETAMINOPHEN 10; 325 MG/1; MG/1
1 TABLET ORAL EVERY 4 HOURS PRN
Status: DISCONTINUED | OUTPATIENT
Start: 2020-11-24 | End: 2020-11-25

## 2020-11-24 RX ORDER — METRONIDAZOLE 500 MG/100ML
500 INJECTION, SOLUTION INTRAVENOUS 2 TIMES DAILY
Status: DISCONTINUED | OUTPATIENT
Start: 2020-11-24 | End: 2020-11-25

## 2020-11-24 RX ORDER — ONDANSETRON 2 MG/ML
INJECTION INTRAMUSCULAR; INTRAVENOUS
Status: DISCONTINUED | OUTPATIENT
Start: 2020-11-24 | End: 2020-11-24

## 2020-11-24 RX ORDER — FERROUS SULFATE 325(65) MG
325 TABLET, DELAYED RELEASE (ENTERIC COATED) ORAL DAILY
Status: DISCONTINUED | OUTPATIENT
Start: 2020-11-25 | End: 2020-11-26 | Stop reason: HOSPADM

## 2020-11-24 RX ADMIN — IBUPROFEN 600 MG: 600 TABLET, FILM COATED ORAL at 11:11

## 2020-11-24 RX ADMIN — Medication 1000 ML/HR: at 02:11

## 2020-11-24 RX ADMIN — SODIUM CHLORIDE, SODIUM LACTATE, POTASSIUM CHLORIDE, AND CALCIUM CHLORIDE: 600; 310; 30; 20 INJECTION, SOLUTION INTRAVENOUS at 01:11

## 2020-11-24 RX ADMIN — IBUPROFEN 600 MG: 600 TABLET, FILM COATED ORAL at 06:11

## 2020-11-24 RX ADMIN — ONDANSETRON 4 MG: 2 INJECTION, SOLUTION INTRAMUSCULAR; INTRAVENOUS at 02:11

## 2020-11-24 RX ADMIN — CEFAZOLIN 2 G: 1 INJECTION, POWDER, FOR SOLUTION INTRAMUSCULAR; INTRAVENOUS at 01:11

## 2020-11-24 RX ADMIN — CHLORHEXIDINE GLUCONATE 0.12% ORAL RINSE 10 ML: 1.2 LIQUID ORAL at 09:11

## 2020-11-24 RX ADMIN — METRONIDAZOLE 500 MG: 500 INJECTION, SOLUTION INTRAVENOUS at 09:11

## 2020-11-24 RX ADMIN — KETOROLAC TROMETHAMINE 30 MG: 30 INJECTION, SOLUTION INTRAMUSCULAR at 02:11

## 2020-11-24 RX ADMIN — Medication 125 ML/HR: at 02:11

## 2020-11-24 RX ADMIN — SODIUM CHLORIDE, SODIUM LACTATE, POTASSIUM CHLORIDE, AND CALCIUM CHLORIDE: .6; .31; .03; .02 INJECTION, SOLUTION INTRAVENOUS at 01:11

## 2020-11-24 RX ADMIN — MORPHINE SULFATE 0.2 MG: 1 INJECTION, SOLUTION EPIDURAL; INTRATHECAL; INTRAVENOUS at 01:11

## 2020-11-24 RX ADMIN — STANDARDIZED SENNA CONCENTRATE AND DOCUSATE SODIUM 1 TABLET: 8.6; 5 TABLET ORAL at 08:11

## 2020-11-24 RX ADMIN — STANDARDIZED SENNA CONCENTRATE AND DOCUSATE SODIUM 1 TABLET: 8.6; 5 TABLET ORAL at 06:11

## 2020-11-24 RX ADMIN — Medication 41.65 MILLI-UNITS/MIN: at 03:11

## 2020-11-24 RX ADMIN — CHLORHEXIDINE GLUCONATE 0.12% ORAL RINSE 10 ML: 1.2 LIQUID ORAL at 08:11

## 2020-11-24 RX ADMIN — CEFTRIAXONE 2 G: 2 INJECTION, SOLUTION INTRAVENOUS at 09:11

## 2020-11-24 RX ADMIN — SODIUM CHLORIDE, SODIUM LACTATE, POTASSIUM CHLORIDE, AND CALCIUM CHLORIDE: .6; .31; .03; .02 INJECTION, SOLUTION INTRAVENOUS at 08:11

## 2020-11-24 RX ADMIN — BUPIVACAINE HYDROCHLORIDE IN DEXTROSE 1.6 ML: 7.5 INJECTION, SOLUTION SUBARACHNOID at 01:11

## 2020-11-24 RX ADMIN — IBUPROFEN 600 MG: 600 TABLET, FILM COATED ORAL at 05:11

## 2020-11-24 RX ADMIN — PHENYLEPHRINE HYDROCHLORIDE 100 MCG: 10 INJECTION INTRAVENOUS at 01:11

## 2020-11-24 RX ADMIN — HYDROCODONE BITARTRATE AND ACETAMINOPHEN 1 TABLET: 5; 325 TABLET ORAL at 08:11

## 2020-11-24 RX ADMIN — METRONIDAZOLE 500 MG: 500 INJECTION, SOLUTION INTRAVENOUS at 10:11

## 2020-11-24 NOTE — PLAN OF CARE
Pt progressing well. Removed pressure dressing and genao cath at 1400. SCDs d/c'd. Pain controlled with po meds.--taking only Ibuprofen at this time. Vitals stable. Bonding with baby.

## 2020-11-24 NOTE — L&D DELIVERY NOTE
[facility]   Section   Operative Note    SUMMARY     Date of Procedure: 2020        OPERATIVE NOTE    2020     PRE-PROCEDURE COUNSELING  Patient counseled on the risks, benefits, and alternatives to procedure.  Please see preoperative consents.   SCDs were applied and working prior to anesthesia induction.    Pre-operative Diagnosis:  Four previous  deliveries, 35w6d, with pyelonephritis, fetal tachycardia, abdominal pain, desires permanent sterilization with bilateral partial salpingectomy    Post-operative Diagnosis: same    PROCEDURE:   repeat low transverse  section with bilateral partial salpingectomy    Surgeon: Freda Romero MD    Assistants: Dianna Angel CST    Anesthesia: Spinal anesthesia     Procedure Details   The patient was seen in the Holding Room. The risks, benefits, complications, treatment options, and expected outcomes were discussed with the patient incl alt to BTL and risk prenancy and ectopic pregnancy.  The patient concurred with the proposed plan, giving informed consent.  The patient was taken to Operating Room, identified as Arabella Gibbs  and the procedure verified as  Delivery. A Time Out was held and the above information confirmed.    After start of anesthesia, the patient was draped and prepped in the usual sterile manner. A Pfannenstiel incision was made and carried down through the subcutaneous tissue to the fascia. Fascial incision was made and extended transversely. There was complete diastasis so there was no rectus muscle under the fascia, just peritoneum and adhesions to the bladder and uterus.  The peritoneum was identified and entered bluntly at the cephalad most area of the fascial opening.  The adhesions were then carefully taken down off the anterior uterine wall with good visualization of the underlying   bladder.   The utero-vesical peritoneal reflection was incised transversely and the bladder  flap was bluntly freed from the lower uterine segment. A low transverse uterine incision was made. There was clear amniotic fluid noted. The baby girl 6'10 was delivered from vertex  Presentation,  and with Apgar scores of 8/9 . The umbilical cord was clamped and cut, and cord blood was obtained. The placenta was removed intact and appeared normal .  The uterine incision was reapproximated with running locked sutures of 0 Chrominc.     Next the bilateral partial salpingectomy was performed. The left tube was grasped with [place] clamps - followed to the fimbriated edge. A fenestration was made in between the larger vessels in the mesosalpinx and each end of the tubal segment was tied off with plain gut suture. The tubal segment was   resected.  The same was performed on the other side.     Lavage was performed and hemostasis noted.. The fascia was then reapproximated with running sutures of 0 loop PDS. The skin was reapproximated with 4-0 vicryl in a subcuticular fashion.  Aquacel bandage was placed over the incision.  Instrument, sponge, and needle counts were correct prior the abdominal closure and at the conclusion of the case.     Estimated Blood Loss:  400mL           Specimens: tubal segments           Complications:  none           Disposition: to recovery PP room           Condition: stable          Delivery Information for Bell Gibbs    Birth information:  YOB: 2020   Time of birth: 2:09 AM   Sex: female   Head Delivery Date/Time:     Delivery type:    Gestational Age: 35w6d    Delivery Providers    Delivering clinician:            Measurements    Weight:   Length:          Apgars    Living status:   Apgars:  1 min.:  5 min.:  10 min.:  15 min.:  20 min.:    Skin color:         Heart rate:         Reflex irritability:         Muscle tone:         Respiratory effort:         Total:                                Interventions/Resuscitation         Cord    No data filed         Placenta    Placenta delivery date/time:   Placenta removal:            Labor Events:       labor:       Labor Onset Date/Time:         Dilation Complete Date/Time:         Start Pushing Date/Time:         Start Pushing Date/Time:       Rupture Date/Time:            Rupture type:          Fluid Amount:       Fluid Color:       Fluid Odor:       Membrane Status:                steroids:       Antibiotics given for GBS:       Induction:       Indications for induction:        Augmentation:       Indications for augmentation:       Labor complications:       Additional complications:          Cervical ripening:                     Delivery:      Episiotomy:       Indication for Episiotomy:       Perineal Lacerations:   Repaired:      Periurethral Laceration:   Repaired:     Labial Laceration:   Repaired:     Sulcus Laceration:   Repaired:     Vaginal Laceration:   Repaired:     Cervical Laceration:   Repaired:     Repair suture:       Repair # of packets:       Last Value - EBL - Nursing (mL):       Sum - EBL - Nursing (mL): 0     Last Value - EBL - Anesthesia (mL):      Calculated QBL (mL):       Vaginal Sweep Performed:       Surgicount Correct:         Other providers:            Details (if applicable):  Trial of Labor      Categorization:      Priority:     Indications for :     Incision Type:       Additional  information:  Forceps:    Vacuum:    Breech:    Observed anomalies    Other (Comments):

## 2020-11-24 NOTE — LACTATION NOTE
Lactation Rounds:    Mother called for lactation assistance. Mother has infant in poor position in football hold on left breast. Shallow latch noted.     Baby is showing feeding cues. Helped mother to settle in a football hold position on the left breast. Reviewed deep asymmetric latch and proper positioning. Mother is able to demonstrate back and adequate obtained. Infrequent swallows noted despite frequent breast massage and compression. Mother reports pain 3/10 initially. Upper and lower lips rolled inward. Suck callous' noted. Upper and lower lips gently flanged outward. Mother denies pain with latch. Infant nursed with frequent stimulation for approximatley 15 minutes. Minimal transfer noted. Nipple shape and color is WDL upon unlatching. Mother encouraged to hand express, and states ' it hurts'. Encouraged mother to hand express on her own, mother states ' I am not squeezing'.    [employer] breast pump set up at bedside.  Instructed on proper usage and to adjust suction according to comfort level. Verified appropriate flange fit- 27mm. Reviewed frequency and duration of pumping in order to promote and maintain full milk supply. Hands-on pumping technique reviewed. Encouraged hand expression after. Instructed on proper cleaning of breast pump parts. Reviewed proper milk handling, collection, storage, and transportation. Voices understanding.    Discussed adequacy of colostrum. Instructed mother on normal  feeding and sleeping patterns. Encouraged mother to breastfeed infant a minimum of 8 times in 24 hours prior to supplementation to promote appropriate breast stimulation for adequate milk supply. Mother reports she is syringe feeding formula after breastfeeding attempts. Mother states she feels comfortable syringe feeding and has been taught how to syringe feed.     Because baby is being supplemented away from the breast, mother was:   - informed that breastfeeding support and assistance is  available as needed  - encouraged to express milk from both breasts each time a supplement is given  - encouraged to use her own collected milk as a first choice for supplementation     feeding plat at bedside and reviewed with mother.    Mother was encouraged to request assistance as needed and voices understanding.    Mother ambivalent during entire encounter.

## 2020-11-24 NOTE — ANESTHESIA PREPROCEDURE EVALUATION
2020  Arabella Gibbs is a 30 y.o., female.  Patient Active Problem List   Diagnosis    History of  delivery    Previous  section complicating pregnancy    History of  delivery, currently pregnant in second trimester    Irregular uterine contractions    Pre-eclampsia in third trimester    Abdominal pain affecting pregnancy    SOB (shortness of breath)    Right sided abdominal pain     No current facility-administered medications on file prior to encounter.      Current Outpatient Medications on File Prior to Encounter   Medication Sig Dispense Refill    ferrous sulfate 325 (65 FE) MG EC tablet Take 1 tablet (325 mg total) by mouth 2 (two) times daily. 60 tablet 3    metroNIDAZOLE (FLAGYL) 500 MG tablet Take 1 tablet (500 mg total) by mouth every 12 (twelve) hours. 14 tablet 0    ondansetron (ZOFRAN-ODT) 8 MG TbDL Take 1 tablet (8 mg total) by mouth every 6 (six) hours as needed. 20 tablet 3    prenatal vit/iron fum/folic ac (PRENATAL 1+1 ORAL) Take by mouth.      promethazine (PHENERGAN) 25 MG tablet Take 1 tablet (25 mg total) by mouth every 6 (six) hours as needed for Nausea. 30 tablet 1     Past Surgical History:   Procedure Laterality Date     SECTION      x4       Anesthesia Evaluation    I have reviewed the Patient Summary Reports.    I have reviewed the Nursing Notes.    I have reviewed the Medications.     Review of Systems  Anesthesia Hx:  No problems with previous Anesthesia  History of prior surgery of interest to airway management or planning: Previous anesthesia: General  Denies Personal Hx of Anesthesia complications.   Social:  Non-Smoker    Hematology/Oncology:  Hematology Normal   Oncology Normal     EENT/Dental:EENT/Dental Normal   Cardiovascular:   Hypertension    Pulmonary:  Pulmonary Normal    Renal/:  Renal/ Normal      Hepatic/GI:  Hepatic/GI Normal    Musculoskeletal:  Musculoskeletal Normal    Neurological:  Neurology Normal    Endocrine:  Endocrine Normal    Dermatological:  Skin Normal    Psych:  Psychiatric Normal           Physical Exam  General:  Well nourished, Obesity    Airway/Jaw/Neck:  Airway Findings: Mouth Opening: Normal Tongue: Normal  Mallampati: II      Dental:  Dental Findings: In tact   Chest/Lungs:  Chest/Lungs Clear    Heart/Vascular:  Heart Findings: Normal Heart murmur: negative       Mental Status:  Mental Status Findings:  Cooperative, Alert and Oriented         Chemistry        Component Value Date/Time     11/23/2020 0815    K 3.3 (L) 11/23/2020 0815     11/23/2020 0815    CO2 25 11/23/2020 0815    BUN 7 11/23/2020 0815    CREATININE 0.7 11/23/2020 0815    GLU 85 11/23/2020 0815        Component Value Date/Time    CALCIUM 8.4 (L) 11/23/2020 0815    ALKPHOS 145 (H) 11/23/2020 0815    AST 18 11/23/2020 0815    ALT 12 11/23/2020 0815    BILITOT 0.3 11/23/2020 0815    ESTGFRAFRICA >60 11/23/2020 0815    EGFRNONAA >60 11/23/2020 0815        Lab Results   Component Value Date    WBC 5.37 11/23/2020    HGB 8.5 (L) 11/23/2020    HCT 27.7 (L) 11/23/2020    MCV 83 11/23/2020     (L) 11/23/2020           Anesthesia Plan  Type of Anesthesia, risks & benefits discussed:  Anesthesia Type:  spinal  Patient's Preference:   Intra-op Monitoring Plan: standard ASA monitors  Intra-op Monitoring Plan Comments:   Post Op Pain Control Plan: multimodal analgesia  Post Op Pain Control Plan Comments:   Induction:   IV  Beta Blocker:  Patient is not currently on a Beta-Blocker (No further documentation required).       Informed Consent: Patient understands risks and agrees with Anesthesia plan.  Questions answered. Anesthesia consent signed with patient.  ASA Score: 2  emergent   Day of Surgery Review of History & Physical: I have interviewed and examined the patient. I have reviewed the patient's H&P dated:     H&P update referred to the surgeon.         Ready For Surgery From Anesthesia Perspective.

## 2020-11-24 NOTE — PROGRESS NOTES
11/23/20 5255   TeleAddis Myers Note - Strip   Strip Reviewed by Lucia Nurse? Yes   TeleStork [facility] Note - Communication   [place] Nurse Communicated with Bedside Nurse Regarding: Fetal Status;Contraction Pattern   TeleStyajaira Myers Note - Notification   Nurse Notified? Yes

## 2020-11-24 NOTE — LACTATION NOTE
This note was copied from a baby's chart.  Discussed feeding choice with mother.  Reviewed benefits of breastfeeding and risks of formula feeding. Mother states her intention is to both breast and formula feed infant. Mother stated that last infant did not latch well but is interested in trying to breast feed this infant.     Discussed early feeding cues and encouraged mother to feed baby in response to those cues. Encouraged unrestricted feedings rather than timed/amount limits, procedural schedules, or visitation schedules. Reviewed normal feeding expectations of 8 or more feedings per 24 hour period, cues that babies use to signal hunger and satiety, and the importance of physical contact during feeding.     Formula Feeding Guide given and reviewed. Discussed proper hand washing, expiration time of formula, position of nipple and bottle while feeding, baby led feeding and satiety cues. Patient verbalized understanding and verbalized appropriate recall.

## 2020-11-24 NOTE — ASSESSMENT & PLAN NOTE
POD #0 s/p repeat LTCS  Pt doing well, afebrile overnight. Follow up void trial.  Proceed with routine post-partum care, encouraged ambulation, aware ok to shower.  Pt counseled on management plan and discharge goals. Anticipate discharge in 2-3 days.

## 2020-11-24 NOTE — PROGRESS NOTES
[facility] -   Obstetrics  Postpartum Progress Note    Patient Name: Arabella Gibbs  MRN: [id number]  Admission Date: 2020  Hospital Length of Stay: 1 days  Attending Physician: Freda Romero MD  Primary Care Provider: Primary Doctor No    Subjective:     Principal Problem:S/P  section    Hospital Course:  EFM / NST  Pre-e labs, PCR, covid swab  Chest x-ray / EKG   hrs pt continues to c/o of abdominal pain to R side, pain is on outer R side, pt to remain NPO, repeat Pre E work up per Dr Romero    11:14 AM dopplers negative for DVT bilaterally.    2pm and 6pm report FHTs 150s  Called at 11:30pm regarding FHTs tachycardic up to 200. Came to assess pt.   Complains now of upper abdominal pain, constant. No fever, no maternal tachycardia.   Pt was afebrile with no c/o chills  D/w MFM James, agree, recommend to proceed w delivery as there is concern for chorioamnionitis vs impending uterine rupture.     (as an aside 2 other emergency deliveries arrived as delivery personal for this delivery was being organized).     2020   Pre-operative Diagnosis:  Four previous  deliveries, 35w6d, with pyelonephritis, fetal tachycardia, abdominal pain, desires permanent sterilization with bilateral partial salpingectomy  Post-operative Diagnosis: same baby girl 6'10  PROCEDURE:   repeat low transverse  section with bilateral partial salpingectomy  EBL 400mL and no complications       Transferred to mother baby unit for routine post partum care. Patient progressed well postoperatively without complication.        Interval History:     She is doing well this afternoon. She is tolerating a regular diet without nausea or vomiting. She is not yet voiding however Lopez has been removed. She is ambulating. She has not passed flatus, and has not a BM. Vaginal bleeding is mild. She denies fever or chills. Abdominal pain is mild and controlled with oral medications. She  is breastfeeding and bottle feeding. She desires circumcision for her male baby: not applicable.    Objective:     Vital Signs (Most Recent):  Temp: 98 °F (36.7 °C) (11/24/20 1257)  Pulse: 88 (11/24/20 1257)  Resp: 20 (11/24/20 1257)  BP: 104/66 (11/24/20 1257)  SpO2: 100 % (11/24/20 0431) Vital Signs (24h Range):  Temp:  [98 °F (36.7 °C)-100.2 °F (37.9 °C)] 98 °F (36.7 °C)  Pulse:  [] 88  Resp:  [20] 20  SpO2:  [98 %-100 %] 100 %  BP: (104-137)/(49-76) 104/66        There is no height or weight on file to calculate BMI.      Intake/Output Summary (Last 24 hours) at 11/24/2020 1516  Last data filed at 11/24/2020 1400  Gross per 24 hour   Intake 900 ml   Output 1580 ml   Net -680 ml           Significant Labs:  Lab Results   Component Value Date    GROUPTRH O POS 05/05/2020    HEPBSAG Negative 05/05/2020    STREPBCULT No Group B Streptococcus isolated 11/17/2020     Recent Labs   Lab 11/23/20  0815   HGB 8.5*   HCT 27.7*       I have personallly reviewed all pertinent lab results from the last 24 hours.    Physical Exam:   Constitutional: She is oriented to person, place, and time. She appears well-developed and well-nourished. No distress.       Cardiovascular: Normal rate, regular rhythm, normal heart sounds and intact distal pulses.    No murmur heard.   Pulmonary/Chest: Effort normal and breath sounds normal. No respiratory distress. She has no wheezes. She has no rales.        Abdominal: Soft. Bowel sounds are normal. She exhibits abdominal incision (Aquacel dressing in place, central staining otherwise dry/intact). She exhibits no distension. There is no abdominal tenderness. There is no guarding.   Uterine fundus firm, below umbilicus, mild tenderness (appropriate)             Musculoskeletal: Moves all extremeties. No edema.      Comments: No calf tenderness       Neurological: She is alert and oriented to person, place, and time.    Skin: Skin is warm and dry. No rash noted. She is not diaphoretic.         Assessment/Plan:     30 y.o. female  for:    * S/P  section  POD #0 s/p repeat LTCS  Pt doing well, afebrile overnight. Follow up void trial.  Proceed with routine post-partum care, encouraged ambulation, aware ok to shower.  Pt counseled on management plan and discharge goals. Anticipate discharge in 2-3 days.            Iron deficiency anemia  preop h/h 8.5/27.7  Resume iron post partum       Pyelonephritis  Follow up urine culture and continue Rocephin.    SOB (shortness of breath)  Chest x-ray-WNL   covid screen-Neg      Abdominal pain affecting pregnancy  EFM / NST  Pre-e labs, PCR, covid swab  Chest x-ray / EKG  /-0700 hrs pt continues to c/o of abdominal pain to R side, pain is on outer R side, pt to remain NPO, repeat Pre E work up per Dr Romero          Disposition: As patient meets milestones, will plan to discharge.    Amanda Pierre PA-C  Obstetrics  [facility]

## 2020-11-24 NOTE — ANESTHESIA POSTPROCEDURE EVALUATION
Anesthesia Post Evaluation    Patient: Arabella Gibbs    Procedure(s) Performed: Procedure(s) (LRB):   SECTION, WITH TUBAL LIGATION (N/A)    Final Anesthesia Type: spinal    Patient location during evaluation: PACU  Patient participation: Yes- Able to Participate  Level of consciousness: awake and alert, oriented and awake  Post-procedure vital signs: reviewed and stable  Pain management: adequate  Airway patency: patent    PONV status at discharge: No PONV  Anesthetic complications: no      Cardiovascular status: blood pressure returned to baseline  Respiratory status: unassisted and spontaneous ventilation  Hydration status: euvolemic  Follow-up not needed.          Vitals Value Taken Time   /65 20 0602   Temp 37.4 °C (99.3 °F) 20 0330   Pulse 90 20 0606   Resp  20 0609   SpO2 100 % 20 0606   Vitals shown include unvalidated device data.      No case tracking events are documented in the log.      Pain/Rcahael Score: Pain Rating Prior to Med Admin: 0 (2020 12:27 PM)

## 2020-11-24 NOTE — LACTATION NOTE
This note was copied from a baby's chart.  Mother requesting supplementation. Reviewed risks of supplementation. Discussed adequacy of colostrum. Instructed mother on normal  feeding and sleeping patterns. Encouraged mother to breastfeed infant a minimum of 8 times in 24 hours prior to supplementation to promote appropriate breast stimulation for adequate milk supply. Discussed with mother preferred alternative feeding methods, such as supplement infant at breast via SNS, syringe, spoon, or cup feeding. Discussed risks and encouraged mother to avoid artificial nipples and bottles. Mother chooses to supplement infant via syringe.  Mother taught how to safely feed infant via this method. Demonstrated by nurse and mother return demonstrates proper and safe usage.   Because baby is being supplemented away from the breast, mother was:   - informed that breastfeeding support and assistance is available as needed  - encouraged to express milk from both breasts each time a supplement is given  - encouraged to use her own collected milk as a first choice for supplementation  Mother was encouraged to request assistance as needed and voices understanding.

## 2020-11-24 NOTE — SUBJECTIVE & OBJECTIVE
Interval History:     She is doing well this afternoon. She is tolerating a regular diet without nausea or vomiting. She is not yet voiding however Lopez has been removed. She is ambulating. She has not passed flatus, and has not a BM. Vaginal bleeding is mild. She denies fever or chills. Abdominal pain is mild and controlled with oral medications. She is breastfeeding and bottle feeding. She desires circumcision for her male baby: not applicable.    Objective:     Vital Signs (Most Recent):  Temp: 98 °F (36.7 °C) (11/24/20 1257)  Pulse: 88 (11/24/20 1257)  Resp: 20 (11/24/20 1257)  BP: 104/66 (11/24/20 1257)  SpO2: 100 % (11/24/20 0431) Vital Signs (24h Range):  Temp:  [98 °F (36.7 °C)-100.2 °F (37.9 °C)] 98 °F (36.7 °C)  Pulse:  [] 88  Resp:  [20] 20  SpO2:  [98 %-100 %] 100 %  BP: (104-137)/(49-76) 104/66        There is no height or weight on file to calculate BMI.      Intake/Output Summary (Last 24 hours) at 11/24/2020 1516  Last data filed at 11/24/2020 1400  Gross per 24 hour   Intake 900 ml   Output 1580 ml   Net -680 ml           Significant Labs:  Lab Results   Component Value Date    GROUPTRH O POS 05/05/2020    HEPBSAG Negative 05/05/2020    STREPBCULT No Group B Streptococcus isolated 11/17/2020     Recent Labs   Lab 11/23/20  0815   HGB 8.5*   HCT 27.7*       I have personallly reviewed all pertinent lab results from the last 24 hours.    Physical Exam:   Constitutional: She is oriented to person, place, and time. She appears well-developed and well-nourished. No distress.       Cardiovascular: Normal rate, regular rhythm, normal heart sounds and intact distal pulses.    No murmur heard.   Pulmonary/Chest: Effort normal and breath sounds normal. No respiratory distress. She has no wheezes. She has no rales.        Abdominal: Soft. Bowel sounds are normal. She exhibits abdominal incision (Aquacel dressing in place, central staining otherwise dry/intact). She exhibits no distension. There is no  abdominal tenderness. There is no guarding.   Uterine fundus firm, below umbilicus, mild tenderness (appropriate)             Musculoskeletal: Moves all extremeties. No edema.      Comments: No calf tenderness       Neurological: She is alert and oriented to person, place, and time.    Skin: Skin is warm and dry. No rash noted. She is not diaphoretic.

## 2020-11-24 NOTE — LACTATION NOTE
This note was copied from a baby's chart.  Called to room for feeding assistance.  Mother reports that infant is not showing many hunger cues, but has been occasionally latching to the breast.  She desired to breastfeed and formula feed.  Today she is choosing for supplementation by syringe and would like to feed by bottle as baby requires more volume.      She states that she tried to breastfeed twice before and that at two weeks the babies lost interest in latching to the breast after supplemental formula was started.    Assisted to position infant to L breast in clutch hold.  Infant achieves a deep latch and show rhythmic jaw movement.  Feeding stops as infant has a bowel movement.    Diaper changed - on large meconium noted.  Infant is showing early hunger cues, assisted to position at R breast in cross cradle hold.  Infant initially approaches with a narrow gape.  Adjusted position and deep latch achieved with rhythmic jaw movement.  Mother reports she will supplement with infant formula via syringe after baby is finished at the breast.

## 2020-11-24 NOTE — ANESTHESIA PROCEDURE NOTES
Spinal    Diagnosis: IUP  Patient location during procedure: OR    Staffing  Authorizing Provider: Km Lema MD  Performing Provider: Km Lema MD    Preanesthetic Checklist  Completed: patient identified, surgical consent, pre-op evaluation, timeout performed, IV checked, risks and benefits discussed and monitors and equipment checked  Spinal Block  Patient position: sitting  Prep: ChloraPrep  Patient monitoring: heart rate, cardiac monitor and continuous pulse ox  Approach: midline  Location: L3-4  Injection technique: single shot  CSF Fluid: clear free-flowing CSF  Needle  Needle type: pencil-tip   Needle gauge: 24 G  Needle length: 3.5 in  Additional Documentation: incremental injection, negative aspiration for CSF, negative aspiration for heme and no paresthesia on injection  Needle localization: anatomical landmarks  Assessment  Sensory level: T6   Dermatomal levels determined by pinch or prick  Ease of block: easy  Patient's tolerance of the procedure: comfortable throughout block and no complaints

## 2020-11-24 NOTE — LACTATION NOTE
Lactation Rounds:    Infant on radiant heat warmer. Education given to mother.    Lactation admit information reviewed. Mother verbalizes understanding of expected  behaviors and output for the first 48 hours of life.  Discussed the importance of cue based feedings on demand, unrestricted access to the breast, and frequent uninterrupted skin to skin contact.  Risk and implications of artificial nipples and non medically indicated formula supplementation discussed.      PLAN  The baby is what we call a late  baby. Late  babies are immature in multiple ways. They cannot be expected to behave like term babies. They are can sleepy, passive, or might not transfer milk well from the breast.      Plan:     Feed based on feeding cues.   Skin to skin every 2-3 hours if no feeding cues.   Notify bedside nurse if no feeding 3 hours from beginning of last feeding.   Attempt feeding baby for 10-15 minutes. If feeding is not nutritive;    Supplement with all expressed breast milk available (from previous pumping/hand expression session).   Pump for 15-20 minutes using hands on pumping   Hand express and collect all available colustrum for baby, save for next feeding.     Expected oral intake per feeding (according to [employer] Breastfeeding [employer]) & expected output for each day of life:  Day 2: 5-15 mL per feeding, 2 voids, 2 stools  Day 3: 15-30 mL per feeding, 3 voids, 3 stools  Day 4: 30-60 mL per feeding, 4 voids, 3 stools     Consider rental of hospital grade pump if primarily pumping for infants 1st month of life.    This might seems like a busy plan, but this plan allows us to feed the baby, and maintain milk supply!     Feed the baby. A baby who is getting the right amount of calories and nutrition is best able to learn how to nurse. First choice for what to feed a non-nursing baby is moms own milk.    Maintain milk supply. If moms milk supply is being maintained with an  "appropriate frequency and amount of milk expression, more time is available for baby to learn to nurse, and babys efforts will be better rewarded (with more milk).    Mother open to pumping "I will try it".     Encouraged mother to call for assistance when desired or when infant is showing signs of hunger. Mother verbalizes understanding of all education and counseling.    "

## 2020-11-25 PROBLEM — R10.9 ABDOMINAL PAIN AFFECTING PREGNANCY: Status: RESOLVED | Noted: 2020-11-22 | Resolved: 2020-11-25

## 2020-11-25 PROBLEM — O26.899 ABDOMINAL PAIN AFFECTING PREGNANCY: Status: RESOLVED | Noted: 2020-11-22 | Resolved: 2020-11-25

## 2020-11-25 PROBLEM — R06.02 SOB (SHORTNESS OF BREATH): Status: RESOLVED | Noted: 2020-11-22 | Resolved: 2020-11-25

## 2020-11-25 LAB — BACTERIA UR CULT: ABNORMAL

## 2020-11-25 PROCEDURE — 63600175 PHARM REV CODE 636 W HCPCS: Performed by: OBSTETRICS & GYNECOLOGY

## 2020-11-25 PROCEDURE — S0030 INJECTION, METRONIDAZOLE: HCPCS | Performed by: OBSTETRICS & GYNECOLOGY

## 2020-11-25 PROCEDURE — 99232 SBSQ HOSP IP/OBS MODERATE 35: CPT | Mod: ,,, | Performed by: OBSTETRICS & GYNECOLOGY

## 2020-11-25 PROCEDURE — 25000003 PHARM REV CODE 250: Performed by: OBSTETRICS & GYNECOLOGY

## 2020-11-25 PROCEDURE — 11000001 HC ACUTE MED/SURG PRIVATE ROOM

## 2020-11-25 PROCEDURE — 99232 PR SUBSEQUENT HOSPITAL CARE,LEVL II: ICD-10-PCS | Mod: ,,, | Performed by: OBSTETRICS & GYNECOLOGY

## 2020-11-25 RX ORDER — OXYCODONE AND ACETAMINOPHEN 5; 325 MG/1; MG/1
1 TABLET ORAL EVERY 4 HOURS PRN
Status: DISCONTINUED | OUTPATIENT
Start: 2020-11-25 | End: 2020-11-26 | Stop reason: HOSPADM

## 2020-11-25 RX ORDER — METRONIDAZOLE 500 MG/1
500 TABLET ORAL EVERY 12 HOURS
Status: DISCONTINUED | OUTPATIENT
Start: 2020-11-26 | End: 2020-11-26 | Stop reason: HOSPADM

## 2020-11-25 RX ORDER — OXYCODONE AND ACETAMINOPHEN 10; 325 MG/1; MG/1
1 TABLET ORAL EVERY 4 HOURS PRN
Status: DISCONTINUED | OUTPATIENT
Start: 2020-11-25 | End: 2020-11-26 | Stop reason: HOSPADM

## 2020-11-25 RX ADMIN — METRONIDAZOLE 500 MG: 500 INJECTION, SOLUTION INTRAVENOUS at 09:11

## 2020-11-25 RX ADMIN — OXYCODONE HYDROCHLORIDE AND ACETAMINOPHEN 1 TABLET: 10; 325 TABLET ORAL at 05:11

## 2020-11-25 RX ADMIN — IBUPROFEN 600 MG: 600 TABLET, FILM COATED ORAL at 11:11

## 2020-11-25 RX ADMIN — HYDROCODONE BITARTRATE AND ACETAMINOPHEN 1 TABLET: 5; 325 TABLET ORAL at 02:11

## 2020-11-25 RX ADMIN — CEFTRIAXONE 2 G: 2 INJECTION, SOLUTION INTRAVENOUS at 08:11

## 2020-11-25 RX ADMIN — IBUPROFEN 600 MG: 600 TABLET, FILM COATED ORAL at 05:11

## 2020-11-25 RX ADMIN — OXYCODONE HYDROCHLORIDE AND ACETAMINOPHEN 1 TABLET: 10; 325 TABLET ORAL at 12:11

## 2020-11-25 RX ADMIN — OXYCODONE HYDROCHLORIDE AND ACETAMINOPHEN 1 TABLET: 10; 325 TABLET ORAL at 09:11

## 2020-11-25 RX ADMIN — METRONIDAZOLE 500 MG: 500 INJECTION, SOLUTION INTRAVENOUS at 10:11

## 2020-11-25 RX ADMIN — STANDARDIZED SENNA CONCENTRATE AND DOCUSATE SODIUM 1 TABLET: 8.6; 5 TABLET ORAL at 08:11

## 2020-11-25 RX ADMIN — CHLORHEXIDINE GLUCONATE 0.12% ORAL RINSE 10 ML: 1.2 LIQUID ORAL at 08:11

## 2020-11-25 RX ADMIN — HYDROCODONE BITARTRATE AND ACETAMINOPHEN 1 TABLET: 5; 325 TABLET ORAL at 08:11

## 2020-11-25 NOTE — ASSESSMENT & PLAN NOTE
POD #1 s/p repeat LTCS  Pt is doing well.  Proceed with routine post-operative care.  Pt counseled on management plan and discharge goals.

## 2020-11-25 NOTE — ASSESSMENT & PLAN NOTE
Pt now afebrile > 36 hrs and without any CVA tenderness.  Culture with presumptive E.coli.  Follow up with final ID and sensitivity.  Continue Rocephin.

## 2020-11-25 NOTE — LACTATION NOTE
Lactation rounds.     Patient reports she is planning to formula feed via bottle from this point on. Reviewed non-nursing engorgement recommendations and lactation availability if she needs assistance. Discussed avoiding breast stimulation or milk removal unless she is uncomfortably full at which time she may express milk to comfort but not to empty. Discussed use of ice packs as needed.     During discussion, patient appeared to be somewhat ambivalent. Encouraged that if she decides to express and give milk to baby that lactation is still available to assist her. At this time, however, we agreed to remove her from lactation census, and she will contact lactation as desired or let her nurse know if she requests to be seen by lactation.     Circled lactation warmline number in breastfeeding guide and encouraged to call with any breastfeeding concerns going forward.

## 2020-11-25 NOTE — PROGRESS NOTES
[facility] -   Obstetrics  Postpartum Progress Note    Patient Name: Arabella Gibbs  MRN: [id number]  Admission Date: 2020  Hospital Length of Stay: 2 days  Attending Physician: Freda Romero MD  Primary Care Provider: Primary Doctor No    Subjective:     Principal Problem:S/P  section    Hospital Course:  EFM / NST  Pre-e labs, PCR, covid swab  Chest x-ray / EKG   hrs pt continues to c/o of abdominal pain to R side, pain is on outer R side, pt to remain NPO, repeat Pre E work up per Dr Romero    11:14 AM dopplers negative for DVT bilaterally.    2pm and 6pm report FHTs 150s  Called at 11:30pm regarding FHTs tachycardic up to 200. Came to assess pt.   Complains now of upper abdominal pain, constant. No fever, no maternal tachycardia.   Pt was afebrile with no c/o chills  D/w MFM James, agree, recommend to proceed w delivery as there is concern for chorioamnionitis vs impending uterine rupture.     (as an aside 2 other emergency deliveries arrived as delivery personal for this delivery was being organized).     2020   Pre-operative Diagnosis:  Four previous  deliveries, 35w6d, with pyelonephritis, fetal tachycardia, abdominal pain, desires permanent sterilization with bilateral partial salpingectomy  Post-operative Diagnosis: same baby girl 6'10  PROCEDURE:   repeat low transverse  section with bilateral partial salpingectomy  EBL 400mL and no complications       Transferred to mother baby unit for routine post partum care. Patient progressed well postoperatively without complication.        Interval History:     She is doing well this morning. She is tolerating a regular diet without nausea or vomiting. She is voiding spontaneously. She is ambulating. She has passed flatus, and has not a BM. Vaginal bleeding is moderate. She denies fever or chills. Abdominal pain is moderate and controlled with oral medications. Denies any back pains  today.      Objective:     Vital Signs (Most Recent):  Temp: 98.1 °F (36.7 °C) (11/25/20 0727)  Pulse: 95 (11/25/20 0727)  Resp: 17 (11/25/20 0807)  BP: 114/74 (11/25/20 0727)  SpO2: 99 % (11/25/20 0443) Vital Signs (24h Range):  Temp:  [97.9 °F (36.6 °C)-98.3 °F (36.8 °C)] 98.1 °F (36.7 °C)  Pulse:  [73-96] 95  Resp:  [17-20] 17  SpO2:  [98 %-99 %] 99 %  BP: (104-117)/(66-74) 114/74        There is no height or weight on file to calculate BMI.      Intake/Output Summary (Last 24 hours) at 11/25/2020 0824  Last data filed at 11/25/2020 0600  Gross per 24 hour   Intake 1 ml   Output 1200 ml   Net -1199 ml           Significant Labs:  Lab Results   Component Value Date    GROUPTRH O POS 05/05/2020    HEPBSAG Negative 05/05/2020    STREPBCULT No Group B Streptococcus isolated 11/17/2020     No results for input(s): HGB, HCT in the last 48 hours.    I have personallly reviewed all pertinent lab results from the last 24 hours.     Microbiology Results (last 7 days)     Procedure Component Value Units Date/Time    Blood culture [[id number]] Collected: 11/23/20 1432    Order Status: Completed Specimen: Blood from Antecubital, Left Arm Updated: 11/25/20 0613     Blood Culture, Routine No Growth to date      No Growth to date    Blood culture [[id number]] Collected: 11/23/20 1437    Order Status: Completed Specimen: Blood Updated: 11/25/20 0613     Blood Culture, Routine No Growth to date      No Growth to date    Urine Culture High Risk [[id number]]  (Abnormal) Collected: 11/23/20 0815    Order Status: Completed Specimen: Urine, Clean Catch Updated: 11/24/20 1427     Urine Culture, Routine PRESUMPTIVE E COLI  >100,000 cfu/ml  Identification and susceptibility pending      Narrative:      Indicated criteria for high risk culture:->Pregnant            Physical Exam:   Constitutional: She is oriented to person, place, and time. She appears well-developed and well-nourished. No distress.       Cardiovascular: Normal rate, regular  rhythm and normal heart sounds.     Pulmonary/Chest: Effort normal and breath sounds normal.        Abdominal: Soft. Bowel sounds are normal. She exhibits abdominal incision (dressing dry and in place). She exhibits no distension and no mass. There is abdominal tenderness (appropriate). There is no rebound, no guarding, no tenderness at McBurney's point and negative Petty's sign. No hernia.     Genitourinary: Uterus is not tender. There is bleeding (lochia) in the vagina.           Musculoskeletal: Normal range of motion and moves all extremeties.       Neurological: She is alert and oriented to person, place, and time.    Skin: Skin is warm and dry.    Psychiatric: She has a normal mood and affect. Her behavior is normal. Thought content normal.       Assessment/Plan:     30 y.o. female  for:    * S/P  section  POD #1 s/p repeat LTCS  Pt is doing well.  Proceed with routine post-operative care.  Pt counseled on management plan and discharge goals.          Iron deficiency anemia  preop h/h 8.5/27.7  Resume iron post partum       Pyelonephritis  Pt now afebrile > 36 hrs and without any CVA tenderness.  Culture with presumptive E.coli.  Follow up with final ID and sensitivity.  Continue Rocephin.        Disposition: As patient meets milestones, will plan to discharge 1-2 days.    Christos Kulkarni MD  Obstetrics  [facility] -

## 2020-11-25 NOTE — SUBJECTIVE & OBJECTIVE
Interval History:     She is doing well this morning. She is tolerating a regular diet without nausea or vomiting. She is voiding spontaneously. She is ambulating. She has passed flatus, and has not a BM. Vaginal bleeding is moderate. She denies fever or chills. Abdominal pain is moderate and controlled with oral medications. Denies any back pains today.      Objective:     Vital Signs (Most Recent):  Temp: 98.1 °F (36.7 °C) (11/25/20 0727)  Pulse: 95 (11/25/20 0727)  Resp: 17 (11/25/20 0807)  BP: 114/74 (11/25/20 0727)  SpO2: 99 % (11/25/20 0443) Vital Signs (24h Range):  Temp:  [97.9 °F (36.6 °C)-98.3 °F (36.8 °C)] 98.1 °F (36.7 °C)  Pulse:  [73-96] 95  Resp:  [17-20] 17  SpO2:  [98 %-99 %] 99 %  BP: (104-117)/(66-74) 114/74        There is no height or weight on file to calculate BMI.      Intake/Output Summary (Last 24 hours) at 11/25/2020 0824  Last data filed at 11/25/2020 0600  Gross per 24 hour   Intake 1 ml   Output 1200 ml   Net -1199 ml           Significant Labs:  Lab Results   Component Value Date    GROUPTRH O POS 05/05/2020    HEPBSAG Negative 05/05/2020    STREPBCULT No Group B Streptococcus isolated 11/17/2020     No results for input(s): HGB, HCT in the last 48 hours.    I have personallly reviewed all pertinent lab results from the last 24 hours.     Microbiology Results (last 7 days)     Procedure Component Value Units Date/Time    Blood culture [[id number]] Collected: 11/23/20 1432    Order Status: Completed Specimen: Blood from Antecubital, Left Arm Updated: 11/25/20 0613     Blood Culture, Routine No Growth to date      No Growth to date    Blood culture [[id number]] Collected: 11/23/20 1437    Order Status: Completed Specimen: Blood Updated: 11/25/20 0613     Blood Culture, Routine No Growth to date      No Growth to date    Urine Culture High Risk [[id number]]  (Abnormal) Collected: 11/23/20 0809    Order Status: Completed Specimen: Urine, Clean Catch Updated: 11/24/20 1427     Urine Culture,  Routine PRESUMPTIVE E COLI  >100,000 cfu/ml  Identification and susceptibility pending      Narrative:      Indicated criteria for high risk culture:->Pregnant            Physical Exam:   Constitutional: She is oriented to person, place, and time. She appears well-developed and well-nourished. No distress.       Cardiovascular: Normal rate, regular rhythm and normal heart sounds.     Pulmonary/Chest: Effort normal and breath sounds normal.        Abdominal: Soft. Bowel sounds are normal. She exhibits abdominal incision (dressing dry and in place). She exhibits no distension and no mass. There is abdominal tenderness (appropriate). There is no rebound, no guarding, no tenderness at McBurney's point and negative Petty's sign. No hernia.     Genitourinary: Uterus is not tender. There is bleeding (lochia) in the vagina.           Musculoskeletal: Normal range of motion and moves all extremeties.       Neurological: She is alert and oriented to person, place, and time.    Skin: Skin is warm and dry.    Psychiatric: She has a normal mood and affect. Her behavior is normal. Thought content normal.

## 2020-11-26 VITALS
HEART RATE: 106 BPM | DIASTOLIC BLOOD PRESSURE: 75 MMHG | OXYGEN SATURATION: 99 % | SYSTOLIC BLOOD PRESSURE: 127 MMHG | TEMPERATURE: 98 F | RESPIRATION RATE: 18 BRPM

## 2020-11-26 PROCEDURE — 25000003 PHARM REV CODE 250: Performed by: OBSTETRICS & GYNECOLOGY

## 2020-11-26 PROCEDURE — 99238 HOSP IP/OBS DSCHRG MGMT 30/<: CPT | Mod: ,,, | Performed by: OBSTETRICS & GYNECOLOGY

## 2020-11-26 PROCEDURE — 25000003 PHARM REV CODE 250: Performed by: PHYSICIAN ASSISTANT

## 2020-11-26 PROCEDURE — 63600175 PHARM REV CODE 636 W HCPCS: Performed by: OBSTETRICS & GYNECOLOGY

## 2020-11-26 PROCEDURE — 99238 PR HOSPITAL DISCHARGE DAY,<30 MIN: ICD-10-PCS | Mod: ,,, | Performed by: OBSTETRICS & GYNECOLOGY

## 2020-11-26 RX ORDER — IBUPROFEN 600 MG/1
600 TABLET ORAL EVERY 8 HOURS PRN
Qty: 30 TABLET | Refills: 1 | Status: SHIPPED | OUTPATIENT
Start: 2020-11-26 | End: 2021-01-26

## 2020-11-26 RX ORDER — OXYCODONE AND ACETAMINOPHEN 5; 325 MG/1; MG/1
1 TABLET ORAL EVERY 8 HOURS PRN
Qty: 10 TABLET | Refills: 0 | Status: SHIPPED | OUTPATIENT
Start: 2020-11-26 | End: 2020-12-28

## 2020-11-26 RX ORDER — CHLORHEXIDINE GLUCONATE ORAL RINSE 1.2 MG/ML
10 SOLUTION DENTAL 2 TIMES DAILY
Status: CANCELLED | OUTPATIENT
Start: 2020-11-26 | End: 2020-12-01

## 2020-11-26 RX ORDER — CEPHALEXIN 500 MG/1
500 CAPSULE ORAL 4 TIMES DAILY
Qty: 28 CAPSULE | Refills: 0 | Status: SHIPPED | OUTPATIENT
Start: 2020-11-26 | End: 2020-12-03

## 2020-11-26 RX ADMIN — CEFTRIAXONE 2 G: 2 INJECTION, SOLUTION INTRAVENOUS at 08:11

## 2020-11-26 RX ADMIN — OXYCODONE HYDROCHLORIDE AND ACETAMINOPHEN 1 TABLET: 10; 325 TABLET ORAL at 08:11

## 2020-11-26 RX ADMIN — IBUPROFEN 600 MG: 600 TABLET, FILM COATED ORAL at 05:11

## 2020-11-26 RX ADMIN — IBUPROFEN 600 MG: 600 TABLET, FILM COATED ORAL at 12:11

## 2020-11-26 RX ADMIN — METRONIDAZOLE 500 MG: 500 TABLET ORAL at 08:11

## 2020-11-26 RX ADMIN — OXYCODONE HYDROCHLORIDE AND ACETAMINOPHEN 1 TABLET: 10; 325 TABLET ORAL at 02:11

## 2020-11-26 RX ADMIN — CHLORHEXIDINE GLUCONATE 0.12% ORAL RINSE 10 ML: 1.2 LIQUID ORAL at 08:11

## 2020-11-26 RX ADMIN — FERROUS SULFATE TAB EC 325 MG (65 MG FE EQUIVALENT) 325 MG: 325 (65 FE) TABLET DELAYED RESPONSE at 08:11

## 2020-11-26 NOTE — SUBJECTIVE & OBJECTIVE
Interval History:      She is doing well this morning. She is tolerating a regular diet without nausea or vomiting. She is voiding spontaneously. She is ambulating. She has passed flatus, and has a BM. Vaginal bleeding is mild. She denies fever or chills. Abdominal pain is mild and controlled with oral medications. She is not breastfeeding. She desires circumcision for her male baby: not applicable.    Objective:     Vital Signs (Most Recent):  Temp: 98.2 °F (36.8 °C) (11/26/20 0359)  Pulse: 83 (11/26/20 0359)  Resp: 18 (11/26/20 0359)  BP: 115/71 (11/26/20 0359)  SpO2: 99 % (11/25/20 0443) Vital Signs (24h Range):  Temp:  [98.2 °F (36.8 °C)-98.5 °F (36.9 °C)] 98.2 °F (36.8 °C)  Pulse:  [83-96] 83  Resp:  [16-18] 18  BP: (115-132)/(71-82) 115/71        There is no height or weight on file to calculate BMI.    No intake or output data in the 24 hours ending 11/26/20 0751        Significant Labs:  Lab Results   Component Value Date    GROUPTRH O POS 05/05/2020    HEPBSAG Negative 05/05/2020    STREPBCULT No Group B Streptococcus isolated 11/17/2020     No results for input(s): HGB, HCT in the last 48 hours.    I have personallly reviewed all pertinent lab results from the last 24 hours.     .    Physical Exam:   Constitutional: She is oriented to person, place, and time. She appears well-developed and well-nourished.    HENT:   Head: Normocephalic.     Neck: Normal range of motion. No thyromegaly present.     Pulmonary/Chest: Effort normal. Right breast exhibits no mass, no tenderness and no swelling. Left breast exhibits no mass, no tenderness and no swelling.   No breast engorgement        Abdominal: Soft. She exhibits no distension. There is no abdominal tenderness (uterine fundus firm below umbilicus and appropriately tender for post delivery).   Aquacel dressing intact and dry     Genitourinary:    Genitourinary Comments: Lochia within normal             Musculoskeletal: Normal range of motion and moves all  extremeties.      Comments: No calf tenderness or Valeria's sign.       Neurological: She is alert and oriented to person, place, and time. She has normal reflexes.    Skin: Skin is warm and dry.    Psychiatric: She has a normal mood and affect.

## 2020-11-26 NOTE — PROGRESS NOTES
[facility] -   Obstetrics  Postpartum Progress Note    Patient Name: Arabella Gibbs  MRN: [id number]  Admission Date: 2020  Hospital Length of Stay: 3 days  Attending Physician: Freda Romero MD  Primary Care Provider: Primary Doctor No    Subjective:     Principal Problem:S/P  section    Hospital Course:  EFM / NST  Pre-e labs, PCR, covid swab  Chest x-ray / EKG   hrs pt continues to c/o of abdominal pain to R side, pain is on outer R side, pt to remain NPO, repeat Pre E work up per Dr Romero    11:14 AM dopplers negative for DVT bilaterally.    2pm and 6pm report FHTs 150s  Called at 11:30pm regarding FHTs tachycardic up to 200. Came to assess pt.   Complains now of upper abdominal pain, constant. No fever, no maternal tachycardia.   Pt was afebrile with no c/o chills  D/w MFM James, agree, recommend to proceed w delivery as there is concern for chorioamnionitis vs impending uterine rupture.     (as an aside 2 other emergency deliveries arrived as delivery personal for this delivery was being organized).     2020   Pre-operative Diagnosis:  Four previous  deliveries, 35w6d, with pyelonephritis, fetal tachycardia, abdominal pain, desires permanent sterilization with bilateral partial salpingectomy  Post-operative Diagnosis: same baby girl 6'10  PROCEDURE:   repeat low transverse  section with bilateral partial salpingectomy  EBL 400mL and no complications       Transferred to mother baby unit for routine post partum care. Patient progressed well postoperatively without complication.          Interval History:      She is doing well this morning. She is tolerating a regular diet without nausea or vomiting. She is voiding spontaneously. She is ambulating. She has passed flatus, and has a BM. Vaginal bleeding is mild. She denies fever or chills. Abdominal pain is mild and controlled with oral medications. She is not breastfeeding. She desires  circumcision for her male baby: not applicable.    Objective:     Vital Signs (Most Recent):  Temp: 98.2 °F (36.8 °C) (20 0359)  Pulse: 83 (20 035)  Resp: 18 (20 035)  BP: 115/71 (20 035)  SpO2: 99 % (20 0443) Vital Signs (24h Range):  Temp:  [98.2 °F (36.8 °C)-98.5 °F (36.9 °C)] 98.2 °F (36.8 °C)  Pulse:  [83-96] 83  Resp:  [16-18] 18  BP: (115-132)/(71-82) 115/71        There is no height or weight on file to calculate BMI.    No intake or output data in the 24 hours ending 20 0751        Significant Labs:  Lab Results   Component Value Date    GROUPTRH O POS 2020    HEPBSAG Negative 2020    STREPBCULT No Group B Streptococcus isolated 2020     No results for input(s): HGB, HCT in the last 48 hours.    I have personallly reviewed all pertinent lab results from the last 24 hours.     .    Physical Exam:   Constitutional: She is oriented to person, place, and time. She appears well-developed and well-nourished.    HENT:   Head: Normocephalic.     Neck: Normal range of motion. No thyromegaly present.     Pulmonary/Chest: Effort normal. Right breast exhibits no mass, no tenderness and no swelling. Left breast exhibits no mass, no tenderness and no swelling.   No breast engorgement        Abdominal: Soft. She exhibits no distension. There is no abdominal tenderness (uterine fundus firm below umbilicus and appropriately tender for post delivery).   Aquacel dressing intact and dry     Genitourinary:    Genitourinary Comments: Lochia within normal             Musculoskeletal: Normal range of motion and moves all extremeties.      Comments: No calf tenderness or Valeria's sign.       Neurological: She is alert and oriented to person, place, and time. She has normal reflexes.    Skin: Skin is warm and dry.    Psychiatric: She has a normal mood and affect.       Assessment/Plan:     30 y.o. female  for:    * S/P  section  POD #1 s/p repeat LTCS  Pt is  doing well.  Proceed with routine post-operative care.  Pt counseled on management plan and discharge goals.   POD#2 doing well. Desires d/c home          Iron deficiency anemia  preop h/h 8.5/27.7  Resume iron post partum       Pyelonephritis  Pt now afebrile > 36 hrs and without any CVA tenderness.  Culture with presumptive E.coli.  Follow up with final ID and sensitivity.  Continue Rocephin.    11/26/2020 complete resolution of CVA tenderness and afebrile over 48hr. Will send home on keflex as urine culture shows sensitivity.    Right sided abdominal pain           Disposition: As patient meets milestones, will plan to discharge  .    Freda Romero MD  Obstetrics  [facility] [place]

## 2020-11-26 NOTE — ASSESSMENT & PLAN NOTE
POD #1 s/p repeat LTCS  Pt is doing well.  Proceed with routine post-operative care.  Pt counseled on management plan and discharge goals.   POD#2 doing well. Desires d/c home

## 2020-11-26 NOTE — ASSESSMENT & PLAN NOTE
Pt now afebrile > 36 hrs and without any CVA tenderness.  Culture with presumptive E.coli.  Follow up with final ID and sensitivity.  Continue Rocephin.    11/26/2020 complete resolution of CVA tenderness and afebrile over 48hr. Will send home on keflex as urine culture shows sensitivity.

## 2020-11-26 NOTE — DISCHARGE INSTRUCTIONS

## 2020-11-26 NOTE — DISCHARGE SUMMARY
[facility] -   Obstetrics  Discharge Summary      Patient Name: Arabella Gibbs  MRN: [id number]  Admission Date: 2020  Hospital Length of Stay: 3 days  Discharge Date and Time:  2020 8:36 AM  Attending Physician: Freda Romero MD   Discharging Provider: Freda Romero MD   Primary Care Provider: Primary Doctor No    HPI: Presents with C/O RUQ pain and SOB worsening this pm          Procedure(s) (LRB):   SECTION, WITH TUBAL LIGATION (N/A)     Hospital Course:   EFM / NST  Pre-e labs, PCR, covid swab  Chest x-ray / EKG   hrs pt continues to c/o of abdominal pain to R side, pain is on outer R side, pt to remain NPO, repeat Pre E work up per Dr Romero    11:14 AM dopplers negative for DVT bilaterally.    2pm and 6pm report FHTs 150s  Called at 11:30pm regarding FHTs tachycardic up to 200. Came to assess pt.   Complains now of upper abdominal pain, constant. No fever, no maternal tachycardia.   Pt was afebrile with no c/o chills  D/w MFM James, agree, recommend to proceed w delivery as there is concern for chorioamnionitis vs impending uterine rupture.     (as an aside 2 other emergency deliveries arrived as delivery personal for this delivery was being organized).     2020   Pre-operative Diagnosis:  Four previous  deliveries, 35w6d, with pyelonephritis, fetal tachycardia, abdominal pain, desires permanent sterilization with bilateral partial salpingectomy  Post-operative Diagnosis: same baby girl 6'10  PROCEDURE:   repeat low transverse  section with bilateral partial salpingectomy  EBL 400mL and no complications       Transferred to mother baby unit for routine post partum care. Patient progressed well postoperatively without complication.               Final Active Diagnoses:    Diagnosis Date Noted POA    PRINCIPAL PROBLEM:  S/P  section [Z98.891] 2020 Not Applicable    Pyelonephritis [N12] 2020 Yes    Iron  deficiency anemia [D50.9] 2020 Yes    Right sided abdominal pain [R10.9] 2020 Yes      Problems Resolved During this Admission:    Diagnosis Date Noted Date Resolved POA    Abdominal pain affecting pregnancy [O26.899, R10.9] 2020 Yes    SOB (shortness of breath) [R06.02] 2020 Yes             Feeding Method: bottle    Immunizations     None          Delivery:    Episiotomy: None   Lacerations: None   Repair suture:     Repair # of packets:     Blood loss (ml):       Birth information:  YOB: 2020   Time of birth: 2:09 AM   Sex: female   Delivery type: , Low Transverse   Gestational Age: 35w6d    Delivery Clinician:      Other providers:       Additional  information:  Forceps:    Vacuum:    Breech:    Observed anomalies      Living?:           APGARS  One minute Five minutes Ten minutes   Skin color:         Heart rate:         Grimace:         Muscle tone:         Breathing:         Totals: 8  9        Placenta: Delivered:       appearance    Pending Diagnostic Studies:     Procedure Component Value Units Date/Time    Specimen to Pathology, Surgery Gynecology and Obstetrics [[id number]] Collected: 20    Order Status: Sent Lab Status: In process Updated: 20 1314          Discharged Condition: good    Disposition: Home or Self Care    Follow Up:  Follow-up Information     [facility] In 1 week.    Why: For dressing removal           Freda Romero MD In 4 weeks.    Specialties: Gynecology, Obstetrics and Gynecology, Obstetrics  Why: Post partum  Contact information:  [id number]  [place] [id number] [phone]                 Patient Instructions:   No discharge procedures on file.  Medications:  Current Discharge Medication List      START taking these medications    Details   cephALEXin (KEFLEX) 500 MG capsule Take 1 capsule (500 mg total) by mouth 4 (four) times daily. for 7 days  Qty: 28 capsule, Refills:  0      ibuprofen (ADVIL,MOTRIN) 600 MG tablet Take 1 tablet (600 mg total) by mouth every 8 (eight) hours as needed for Pain (take with food, and may alternate with norco).  Qty: 30 tablet, Refills: 1      oxyCODONE-acetaminophen (PERCOCET) 5-325 mg per tablet Take 1 tablet by mouth every 8 (eight) hours as needed for Pain.  Qty: 10 tablet, Refills: 0    Comments: Quantity prescribed more than 7 day supply? No         CONTINUE these medications which have NOT CHANGED    Details   ferrous sulfate 325 (65 FE) MG EC tablet Take 1 tablet (325 mg total) by mouth 2 (two) times daily.  Qty: 60 tablet, Refills: 3      metroNIDAZOLE (FLAGYL) 500 MG tablet Take 1 tablet (500 mg total) by mouth every 12 (twelve) hours.  Qty: 14 tablet, Refills: 0         STOP taking these medications       ondansetron (ZOFRAN-ODT) 8 MG TbDL Comments:   Reason for Stopping:         prenatal vit/iron fum/folic ac (PRENATAL 1+1 ORAL) Comments:   Reason for Stopping:         promethazine (PHENERGAN) 25 MG tablet Comments:   Reason for Stopping:               Freda Romero MD  Obstetrics  [facility]

## 2020-11-29 LAB
BACTERIA BLD CULT: NORMAL
BACTERIA BLD CULT: NORMAL

## 2020-12-01 ENCOUNTER — POSTPARTUM VISIT (OUTPATIENT)
Dept: OBSTETRICS AND GYNECOLOGY | Facility: CLINIC | Age: 30
End: 2020-12-01
Payer: MEDICAID

## 2020-12-01 VITALS
SYSTOLIC BLOOD PRESSURE: 122 MMHG | WEIGHT: 175.94 LBS | DIASTOLIC BLOOD PRESSURE: 86 MMHG | HEIGHT: 65 IN | BODY MASS INDEX: 29.31 KG/M2

## 2020-12-01 DIAGNOSIS — N12 PYELONEPHRITIS: ICD-10-CM

## 2020-12-01 DIAGNOSIS — Z98.891 S/P CESAREAN SECTION: Primary | ICD-10-CM

## 2020-12-01 PROCEDURE — 59430 PR CARE AFTER DELIVERY ONLY: ICD-10-PCS | Mod: TH,,, | Performed by: OBSTETRICS & GYNECOLOGY

## 2020-12-01 PROCEDURE — 99999 PR PBB SHADOW E&M-EST. PATIENT-LVL III: CPT | Mod: PBBFAC,,,

## 2020-12-01 PROCEDURE — 99213 OFFICE O/P EST LOW 20 MIN: CPT | Mod: PBBFAC

## 2020-12-01 PROCEDURE — 99999 PR PBB SHADOW E&M-EST. PATIENT-LVL III: ICD-10-PCS | Mod: PBBFAC,,,

## 2020-12-01 NOTE — PROGRESS NOTES
Subjective:       Patient ID: Arabella Gibbs is a 30 y.o. female.    Chief Complaint:  Wound Check      History of Present Illness  HPI  Postoperative Follow-up  Patient presents to the clinic 1 weeks status post  for post op follow up and dressing removal. Eating a regular diet without difficulty. Bowel movements are normal. Pain is controlled with current analgesics. Medications being used: ibuprofen (OTC) and oxycodone.  Reports resolution of R sided flank pain    GYN & OB History  Patient's last menstrual period was 2020 (exact date).   Date of Last Pap: 2020    OB History    Para Term  AB Living   6 5 2 3 1 5   SAB TAB Ectopic Multiple Live Births   1 0 0 0 5      # Outcome Date GA Lbr Mark/2nd Weight Sex Delivery Anes PTL Lv   6  20 35w6d  3.01 kg (6 lb 10.2 oz) F CS-LTranv Spinal N ANN   5  18 36w0d  2.268 kg (5 lb) M CS-LVertical Spinal Y ANN   4  16 35w4d  2.54 kg (5 lb 9.6 oz) F CS-LTranv Spinal Y ANN   3 SAB 2016           2 Term 13 39w0d  3.374 kg (7 lb 7 oz) M CS-LTranv Spinal  ANN   1 Term 09 39w0d  2.722 kg (6 lb) M CS-LTranv EPI N ANN      Complications: Failure to Progress in First Stage       Review of Systems  Review of Systems   Constitutional: Negative for activity change, chills, fatigue and fever.   Respiratory: Negative for cough.    Cardiovascular: Negative for chest pain and leg swelling.   Gastrointestinal: Negative for abdominal pain, constipation, nausea and vomiting.   Genitourinary: Negative for dysuria, frequency, hematuria, pelvic pain, urgency, vaginal bleeding and vaginal discharge.   Integumentary:  Negative for rash.           Objective:    Physical Exam:   Constitutional: She is oriented to person, place, and time. She appears well-developed and well-nourished. No distress.       Cardiovascular: Normal rate.     Pulmonary/Chest: Effort normal. No respiratory distress.        Abdominal:  Soft. She exhibits abdominal incision (Aquacel dressing in place, clear/dry/intact; dressing removed and incision is intact and healing well without erythema/induration/drainage). She exhibits no distension. There is no abdominal tenderness. There is no guarding.             Musculoskeletal: Moves all extremeties. No edema.      Comments: No calf tenderness       Neurological: She is alert and oriented to person, place, and time.    Skin: Skin is warm and dry. No rash noted. She is not diaphoretic.           Problem List Items Addressed This Visit        Renal/    Pyelonephritis       Obstetric    S/P  section - Primary        Patient doing well post op  Complete keflex as prescribed  Patient is scheduled for postpartum visit with Dr Quintana.    Reviewed all restrictions & limitations with the patient. Advised to call clinic in event of fever, redness or drainage around the incision, worsening pain, or any any concerning issues or symptoms.  Instructed the importance of showering daily, no tub baths, using an antibacterial soap.  Patient verbalized understanding.

## 2020-12-02 LAB
FINAL PATHOLOGIC DIAGNOSIS: NORMAL
GROSS: NORMAL
Lab: NORMAL

## 2020-12-28 ENCOUNTER — POSTPARTUM VISIT (OUTPATIENT)
Dept: OBSTETRICS AND GYNECOLOGY | Facility: CLINIC | Age: 30
End: 2020-12-28
Payer: MEDICAID

## 2020-12-28 VITALS
SYSTOLIC BLOOD PRESSURE: 132 MMHG | WEIGHT: 161.63 LBS | DIASTOLIC BLOOD PRESSURE: 86 MMHG | BODY MASS INDEX: 26.89 KG/M2

## 2020-12-28 DIAGNOSIS — Z98.891 S/P CESAREAN SECTION: ICD-10-CM

## 2020-12-28 PROBLEM — O47.9 IRREGULAR UTERINE CONTRACTIONS: Status: RESOLVED | Noted: 2020-10-08 | Resolved: 2020-12-28

## 2020-12-28 PROBLEM — O14.93 PRE-ECLAMPSIA IN THIRD TRIMESTER: Status: RESOLVED | Noted: 2020-11-21 | Resolved: 2020-12-28

## 2020-12-28 PROBLEM — O34.219 PREVIOUS CESAREAN SECTION COMPLICATING PREGNANCY: Status: RESOLVED | Noted: 2020-05-12 | Resolved: 2020-12-28

## 2020-12-28 PROBLEM — O09.892 HISTORY OF PRETERM DELIVERY, CURRENTLY PREGNANT IN SECOND TRIMESTER: Status: RESOLVED | Noted: 2020-08-06 | Resolved: 2020-12-28

## 2020-12-28 PROBLEM — Z87.51 HISTORY OF PRETERM DELIVERY: Status: RESOLVED | Noted: 2020-05-05 | Resolved: 2020-12-28

## 2020-12-28 PROCEDURE — 0503F POSTPARTUM CARE VISIT: CPT | Mod: ,,, | Performed by: OBSTETRICS & GYNECOLOGY

## 2020-12-28 PROCEDURE — 99999 PR PBB SHADOW E&M-EST. PATIENT-LVL III: CPT | Mod: PBBFAC,,, | Performed by: OBSTETRICS & GYNECOLOGY

## 2020-12-28 PROCEDURE — 99213 OFFICE O/P EST LOW 20 MIN: CPT | Mod: PBBFAC,TH,PN | Performed by: OBSTETRICS & GYNECOLOGY

## 2020-12-28 PROCEDURE — 99999 PR PBB SHADOW E&M-EST. PATIENT-LVL III: ICD-10-PCS | Mod: PBBFAC,,, | Performed by: OBSTETRICS & GYNECOLOGY

## 2020-12-28 PROCEDURE — 0503F PR POSTPARTUM CARE VISIT: ICD-10-PCS | Mod: ,,, | Performed by: OBSTETRICS & GYNECOLOGY

## 2020-12-28 RX ORDER — PROMETHAZINE HYDROCHLORIDE 25 MG/1
25 TABLET ORAL EVERY 6 HOURS PRN
COMMUNITY
Start: 2020-12-09

## 2020-12-28 RX ORDER — SERTRALINE HYDROCHLORIDE 50 MG/1
50 TABLET, FILM COATED ORAL DAILY
Qty: 30 TABLET | Refills: 6 | Status: SHIPPED | OUTPATIENT
Start: 2020-12-28 | End: 2021-03-01 | Stop reason: SDUPTHER

## 2020-12-28 NOTE — PROGRESS NOTES
"Subjective:       Patient ID: Arabella Gibbs is a 30 y.o. female.    Chief Complaint:  Postpartum Care      History of Present Illness  HPI  Postoperative Follow-up  Patient presents to the clinic 5 weeks status post  for elective repeat.; also had tl for desires sterilization; Eating a regular diet without difficulty. Bowel movements are normal. The patient is not having any pain.   Reports no problems void  No further having back pains  Denies preE symptoms  Pp depression scale 22/30--feels "baby blues" is not improving    Due to return to work 21  Has help at home        GYN & OB History  Patient's last menstrual period was 2020 (exact date).   Date of Last Pap: 2020    OB History    Para Term  AB Living   6 5 2 3 1 5   SAB TAB Ectopic Multiple Live Births   1 0 0 0 5      # Outcome Date GA Lbr Mark/2nd Weight Sex Delivery Anes PTL Lv   6  20 35w6d  3.01 kg (6 lb 10.2 oz) F CS-LTranv Spinal N ANN   5  18 36w0d  2.268 kg (5 lb) M CS-LVertical Spinal Y ANN   4  16 35w4d  2.54 kg (5 lb 9.6 oz) F CS-LTranv Spinal Y ANN   3 2016           2 Term 13 39w0d  3.374 kg (7 lb 7 oz) M CS-LTranv Spinal  ANN   1 Term 09 39w0d  2.722 kg (6 lb) M CS-LTranv EPI N ANN      Complications: Failure to Progress in First Stage       Review of Systems  Review of Systems   All other systems reviewed and are negative.          Objective:      Physical Exam:   Constitutional: She appears well-developed.     Eyes: Pupils are equal, round, and reactive to light. Conjunctivae and EOM are normal.    Neck: Normal range of motion. Neck supple.     Pulmonary/Chest: Effort normal. Right breast exhibits no mass, no nipple discharge, no skin change and no tenderness. Left breast exhibits no mass, no nipple discharge, no skin change and no tenderness. Breasts are symmetrical.        Abdominal: Soft. She exhibits abdominal incision (intact, no " errythema, exudate; well healed).     Genitourinary:    Vagina, uterus and rectum normal.      Pelvic exam was performed with patient supine.   Cervix is normal. Right adnexum displays no mass and no tenderness. Left adnexum displays no mass and no tenderness. No erythema, bleeding, rectocele, cystocele or unspecified prolapse of vaginal walls in the vagina. Labial bartholins normal.negative for vaginal discharge       Uterus Size: 6 cm   Musculoskeletal: Normal range of motion.      Comments: Bruise on rt leg--tripped and fell at home on floor (t molding)       Neurological: She is alert.    Skin: Skin is warm.    Psychiatric: She has a normal mood and affect.           Assessment:         Encounter Diagnoses   Name Primary?    Postpartum depression     S/P  section     Postpartum care following  delivery Yes               Plan:      Released from ob care  Accepts rx of zoloft for post partum depression; return in 1 wk for depression f/u  Note given to rtw 21  ww exam due after 21

## 2021-01-14 ENCOUNTER — PATIENT MESSAGE (OUTPATIENT)
Dept: OBSTETRICS AND GYNECOLOGY | Facility: CLINIC | Age: 31
End: 2021-01-14

## 2021-01-25 ENCOUNTER — PATIENT MESSAGE (OUTPATIENT)
Dept: OBSTETRICS AND GYNECOLOGY | Facility: CLINIC | Age: 31
End: 2021-01-25

## 2021-01-26 ENCOUNTER — OFFICE VISIT (OUTPATIENT)
Dept: OBSTETRICS AND GYNECOLOGY | Facility: CLINIC | Age: 31
End: 2021-01-26
Payer: MEDICAID

## 2021-01-26 ENCOUNTER — PATIENT MESSAGE (OUTPATIENT)
Dept: OBSTETRICS AND GYNECOLOGY | Facility: CLINIC | Age: 31
End: 2021-01-26

## 2021-01-26 ENCOUNTER — PROCEDURE VISIT (OUTPATIENT)
Dept: OBSTETRICS AND GYNECOLOGY | Facility: CLINIC | Age: 31
End: 2021-01-26
Payer: MEDICAID

## 2021-01-26 ENCOUNTER — LAB VISIT (OUTPATIENT)
Dept: LAB | Facility: HOSPITAL | Age: 31
End: 2021-01-26
Attending: NURSE PRACTITIONER
Payer: MEDICAID

## 2021-01-26 VITALS
DIASTOLIC BLOOD PRESSURE: 82 MMHG | BODY MASS INDEX: 26.85 KG/M2 | SYSTOLIC BLOOD PRESSURE: 126 MMHG | WEIGHT: 161.38 LBS

## 2021-01-26 DIAGNOSIS — R53.83 FATIGUE, UNSPECIFIED TYPE: ICD-10-CM

## 2021-01-26 DIAGNOSIS — N93.8 DUB (DYSFUNCTIONAL UTERINE BLEEDING): Primary | ICD-10-CM

## 2021-01-26 DIAGNOSIS — N93.8 DUB (DYSFUNCTIONAL UTERINE BLEEDING): ICD-10-CM

## 2021-01-26 LAB
B-HCG UR QL: NEGATIVE
BASOPHILS # BLD AUTO: 0.03 K/UL (ref 0–0.2)
BASOPHILS NFR BLD: 0.6 % (ref 0–1.9)
CTP QC/QA: YES
DIFFERENTIAL METHOD: ABNORMAL
EOSINOPHIL # BLD AUTO: 0.2 K/UL (ref 0–0.5)
EOSINOPHIL NFR BLD: 4.8 % (ref 0–8)
ERYTHROCYTE [DISTWIDTH] IN BLOOD BY AUTOMATED COUNT: 17.1 % (ref 11.5–14.5)
HCT VFR BLD AUTO: 32.2 % (ref 37–48.5)
HGB BLD-MCNC: 9.6 G/DL (ref 12–16)
IMM GRANULOCYTES # BLD AUTO: 0.01 K/UL (ref 0–0.04)
IMM GRANULOCYTES NFR BLD AUTO: 0.2 % (ref 0–0.5)
LYMPHOCYTES # BLD AUTO: 2.3 K/UL (ref 1–4.8)
LYMPHOCYTES NFR BLD: 48.1 % (ref 18–48)
MCH RBC QN AUTO: 25.4 PG (ref 27–31)
MCHC RBC AUTO-ENTMCNC: 29.8 G/DL (ref 32–36)
MCV RBC AUTO: 85 FL (ref 82–98)
MONOCYTES # BLD AUTO: 0.3 K/UL (ref 0.3–1)
MONOCYTES NFR BLD: 6.3 % (ref 4–15)
NEUTROPHILS # BLD AUTO: 1.9 K/UL (ref 1.8–7.7)
NEUTROPHILS NFR BLD: 40 % (ref 38–73)
NRBC BLD-RTO: 0 /100 WBC
PLATELET # BLD AUTO: 265 K/UL (ref 150–350)
PMV BLD AUTO: 12.2 FL (ref 9.2–12.9)
RBC # BLD AUTO: 3.78 M/UL (ref 4–5.4)
WBC # BLD AUTO: 4.8 K/UL (ref 3.9–12.7)

## 2021-01-26 PROCEDURE — 36415 COLL VENOUS BLD VENIPUNCTURE: CPT | Mod: PO

## 2021-01-26 PROCEDURE — 99999 PR PBB SHADOW E&M-EST. PATIENT-LVL III: CPT | Mod: PBBFAC,,, | Performed by: NURSE PRACTITIONER

## 2021-01-26 PROCEDURE — 99999 PR PBB SHADOW E&M-EST. PATIENT-LVL III: ICD-10-PCS | Mod: PBBFAC,,, | Performed by: NURSE PRACTITIONER

## 2021-01-26 PROCEDURE — 99213 PR OFFICE/OUTPT VISIT, EST, LEVL III, 20-29 MIN: ICD-10-PCS | Mod: S$PBB,25,, | Performed by: NURSE PRACTITIONER

## 2021-01-26 PROCEDURE — 85025 COMPLETE CBC W/AUTO DIFF WBC: CPT

## 2021-01-26 PROCEDURE — 99213 OFFICE O/P EST LOW 20 MIN: CPT | Mod: S$PBB,25,, | Performed by: NURSE PRACTITIONER

## 2021-01-26 PROCEDURE — 99213 OFFICE O/P EST LOW 20 MIN: CPT | Mod: PBBFAC,PN,25 | Performed by: NURSE PRACTITIONER

## 2021-01-26 PROCEDURE — 84443 ASSAY THYROID STIM HORMONE: CPT

## 2021-01-26 PROCEDURE — 76856 US EXAM PELVIC COMPLETE: CPT | Mod: PBBFAC,PN | Performed by: OBSTETRICS & GYNECOLOGY

## 2021-01-26 PROCEDURE — 81025 URINE PREGNANCY TEST: CPT | Mod: PBBFAC,PN | Performed by: NURSE PRACTITIONER

## 2021-01-26 PROCEDURE — 76856 US EXAM PELVIC COMPLETE: CPT | Mod: 26,S$PBB,, | Performed by: OBSTETRICS & GYNECOLOGY

## 2021-01-26 PROCEDURE — 76856 PR  ECHO,PELVIC (NONOBSTETRIC): ICD-10-PCS | Mod: 26,S$PBB,, | Performed by: OBSTETRICS & GYNECOLOGY

## 2021-01-26 RX ORDER — MEDROXYPROGESTERONE ACETATE 10 MG/1
10 TABLET ORAL DAILY
Qty: 10 TABLET | Refills: 0 | Status: SHIPPED | OUTPATIENT
Start: 2021-01-26 | End: 2021-02-08 | Stop reason: CLARIF

## 2021-01-27 ENCOUNTER — PATIENT MESSAGE (OUTPATIENT)
Dept: OBSTETRICS AND GYNECOLOGY | Facility: CLINIC | Age: 31
End: 2021-01-27

## 2021-01-27 DIAGNOSIS — D50.9 IRON DEFICIENCY ANEMIA, UNSPECIFIED IRON DEFICIENCY ANEMIA TYPE: Primary | ICD-10-CM

## 2021-01-27 LAB — TSH SERPL DL<=0.005 MIU/L-ACNC: 0.43 UIU/ML (ref 0.4–4)

## 2021-01-27 RX ORDER — FERROUS SULFATE 325(65) MG
325 TABLET, DELAYED RELEASE (ENTERIC COATED) ORAL 2 TIMES DAILY
Qty: 60 TABLET | Refills: 3 | Status: SHIPPED | OUTPATIENT
Start: 2021-01-27 | End: 2021-02-18

## 2021-02-07 ENCOUNTER — PATIENT MESSAGE (OUTPATIENT)
Dept: OBSTETRICS AND GYNECOLOGY | Facility: CLINIC | Age: 31
End: 2021-02-07

## 2021-02-08 ENCOUNTER — OFFICE VISIT (OUTPATIENT)
Dept: OBSTETRICS AND GYNECOLOGY | Facility: CLINIC | Age: 31
End: 2021-02-08
Payer: MEDICAID

## 2021-02-08 DIAGNOSIS — Z30.011 OCP (ORAL CONTRACEPTIVE PILLS) INITIATION: ICD-10-CM

## 2021-02-08 DIAGNOSIS — N93.8 DUB (DYSFUNCTIONAL UTERINE BLEEDING): Primary | ICD-10-CM

## 2021-02-08 PROCEDURE — 99213 OFFICE O/P EST LOW 20 MIN: CPT | Mod: 95,,, | Performed by: NURSE PRACTITIONER

## 2021-02-08 PROCEDURE — 99213 PR OFFICE/OUTPT VISIT, EST, LEVL III, 20-29 MIN: ICD-10-PCS | Mod: 95,,, | Performed by: NURSE PRACTITIONER

## 2021-02-08 RX ORDER — NORETHINDRONE ACETATE AND ETHINYL ESTRADIOL 1MG-20(21)
1 KIT ORAL DAILY
Qty: 28 TABLET | Refills: 5 | Status: SHIPPED | OUTPATIENT
Start: 2021-02-08 | End: 2021-08-23

## 2021-03-01 ENCOUNTER — PATIENT MESSAGE (OUTPATIENT)
Dept: OBSTETRICS AND GYNECOLOGY | Facility: CLINIC | Age: 31
End: 2021-03-01

## 2021-03-03 ENCOUNTER — PATIENT MESSAGE (OUTPATIENT)
Dept: OBSTETRICS AND GYNECOLOGY | Facility: CLINIC | Age: 31
End: 2021-03-03

## 2021-04-29 ENCOUNTER — PATIENT MESSAGE (OUTPATIENT)
Dept: RESEARCH | Facility: HOSPITAL | Age: 31
End: 2021-04-29

## 2021-08-23 ENCOUNTER — PATIENT MESSAGE (OUTPATIENT)
Dept: OBSTETRICS AND GYNECOLOGY | Facility: CLINIC | Age: 31
End: 2021-08-23

## 2023-07-01 NOTE — HPI
30 y.o.  BLADE 2020 EGA 31w3d here for contractions and vaginal discharge since 0650am.     2-calculated by average/Not able to assess (calculate score using [facility] averaging method)

## (undated) DEVICE — TAPE SILK 3IN

## (undated) DEVICE — DRESSING AQUACEL AG 3.5X10IN

## (undated) DEVICE — PAD ABD 8X10 STERILE